# Patient Record
Sex: MALE | Race: WHITE | NOT HISPANIC OR LATINO | Employment: FULL TIME | URBAN - METROPOLITAN AREA
[De-identification: names, ages, dates, MRNs, and addresses within clinical notes are randomized per-mention and may not be internally consistent; named-entity substitution may affect disease eponyms.]

---

## 2018-07-23 ENCOUNTER — OFFICE VISIT (OUTPATIENT)
Dept: FAMILY MEDICINE | Facility: CLINIC | Age: 33
End: 2018-07-23
Payer: COMMERCIAL

## 2018-07-23 VITALS
DIASTOLIC BLOOD PRESSURE: 80 MMHG | RESPIRATION RATE: 16 BRPM | TEMPERATURE: 97.8 F | HEIGHT: 73 IN | SYSTOLIC BLOOD PRESSURE: 138 MMHG | BODY MASS INDEX: 40.42 KG/M2 | HEART RATE: 82 BPM | WEIGHT: 305 LBS | OXYGEN SATURATION: 98 %

## 2018-07-23 DIAGNOSIS — Z00.00 WELLNESS EXAMINATION: Primary | ICD-10-CM

## 2018-07-23 PROBLEM — J30.2 SEASONAL ALLERGIES: Status: ACTIVE | Noted: 2018-07-23

## 2018-07-23 PROBLEM — M41.9 SCOLIOSIS: Status: ACTIVE | Noted: 2018-07-23

## 2018-07-23 PROCEDURE — 90715 TDAP VACCINE 7 YRS/> IM: CPT | Performed by: FAMILY MEDICINE

## 2018-07-23 PROCEDURE — 90471 IMMUNIZATION ADMIN: CPT | Performed by: FAMILY MEDICINE

## 2018-07-23 PROCEDURE — 99385 PREV VISIT NEW AGE 18-39: CPT | Mod: 25 | Performed by: FAMILY MEDICINE

## 2018-07-23 ASSESSMENT — ENCOUNTER SYMPTOMS
NAUSEA: 0
SORE THROAT: 0
FEVER: 0
SHORTNESS OF BREATH: 0
COUGH: 0
ARTHRALGIAS: 0
SLEEP DISTURBANCE: 0
CHILLS: 0
FATIGUE: 0
PALPITATIONS: 0
BACK PAIN: 0
BLOOD IN STOOL: 0
ABDOMINAL PAIN: 0
FREQUENCY: 0
HEMATURIA: 0
VOMITING: 0
DIARRHEA: 0
CONSTIPATION: 0
HEADACHES: 0
SINUS PRESSURE: 0
DIZZINESS: 0

## 2018-07-23 NOTE — PROGRESS NOTES
Subjective      Patient ID: Michael Lew is a 33 y.o. male.    - Patient presenting for annual wellness exam as new patient  - Lab work - last time was about 2012  - Immunizations - due for Tdap  - Exercise - does a lot of jogging; high intensity interval training.  Scoliosis limits exercise at times.   - Diet - does really well during the week, drinks a lot of water; weekend is a different story; trying to eat less   - Lives with wife; they are thinking of having kids  - Works for [employer]        The following have been reviewed and updated as appropriate in this visit:  Tobacco  Allergies  Meds  Problems  Med Hx  Surg Hx  Fam Hx  Soc Hx         History reviewed. No pertinent past medical history.    History reviewed. No pertinent surgical history.    Social History     Social History   • Marital status:      Spouse name: N/A   • Number of children: N/A   • Years of education: N/A     Occupational History   • Not on file.     Social History Main Topics   • Smoking status: Never Smoker   • Smokeless tobacco: Never Used   • Alcohol use Yes   • Drug use: Unknown   • Sexual activity: Not on file     Other Topics Concern   • Not on file     Social History Narrative   • No narrative on file       Family History   Problem Relation Age of Onset   • Breast cancer Mother    • Diabetes Mother    • Hypertension Mother    • Asthma Father    • Diabetes Father        Patient has no known allergies.    No current outpatient prescriptions on file.     No current facility-administered medications for this visit.        Review of Systems   Constitutional: Negative for chills, fatigue and fever.   HENT: Negative for congestion, sinus pressure and sore throat.    Eyes: Negative for visual disturbance.   Respiratory: Negative for cough and shortness of breath.    Cardiovascular: Negative for chest pain, palpitations and leg swelling.   Gastrointestinal: Negative for abdominal pain, blood in stool,  "constipation, diarrhea, nausea and vomiting.   Endocrine: Negative for polyuria.   Genitourinary: Negative for frequency and hematuria.   Musculoskeletal: Negative for arthralgias and back pain.   Skin: Negative for rash.   Neurological: Negative for dizziness and headaches.   Psychiatric/Behavioral: Negative for behavioral problems and sleep disturbance.       Objective     Vitals:    07/23/18 1010   BP: 138/80   Patient Position: Sitting   Pulse: 82   Resp: 16   Temp: 36.6 °C (97.8 °F)   SpO2: 98%   Weight: (!) 138 kg (305 lb)   Height: 1.842 m (6' 0.5\")     Body mass index is 40.8 kg/m².    Physical Exam   Constitutional: He is oriented to person, place, and time. He appears well-developed and well-nourished.  Non-toxic appearance. No distress.   HENT:   Head: Normocephalic and atraumatic.   Right Ear: Tympanic membrane, external ear and ear canal normal.   Left Ear: Tympanic membrane, external ear and ear canal normal.   Nose: Nose normal.   Mouth/Throat: Uvula is midline and oropharynx is clear and moist.   Eyes: Conjunctivae are normal. Pupils are equal, round, and reactive to light.   Neck: Normal range of motion. No thyromegaly present.   Cardiovascular: Normal rate, regular rhythm, S1 normal, S2 normal and normal heart sounds.    No murmur heard.  Pulmonary/Chest: Effort normal and breath sounds normal. He has no wheezes. He has no rhonchi. He has no rales.   Abdominal: Soft. Bowel sounds are normal. There is no hepatosplenomegaly. There is no tenderness. There is no rebound and no guarding.   Musculoskeletal: Normal range of motion. He exhibits no edema.   Neurological: He is alert and oriented to person, place, and time. He has normal strength. He is not disoriented. No cranial nerve deficit or sensory deficit.   Psychiatric: He has a normal mood and affect. His behavior is normal. His mood appears not anxious. He does not exhibit a depressed mood.   Nursing note and vitals reviewed.      Assessment/Plan "   Problem List Items Addressed This Visit     None      Visit Diagnoses     Wellness examination    -  Primary    Relevant Orders    Lipid panel    Comprehensive metabolic panel    CBC and Differential

## 2018-07-23 NOTE — PATIENT INSTRUCTIONS
- Keep up the great work with exercise and healthy dietary changes!    - Check labs while fasting when you can  - Tdap today  - Would recommend seeing Dermatology - Dr. Chapis Nettles in [place], [facility] in [place], or Jeff Richmond in [place]    - Follow up in 1 year

## 2019-12-05 ENCOUNTER — TELEPHONE (OUTPATIENT)
Dept: FAMILY MEDICINE | Facility: CLINIC | Age: 34
End: 2019-12-05

## 2019-12-05 DIAGNOSIS — Z00.00 WELLNESS EXAMINATION: Primary | ICD-10-CM

## 2019-12-05 NOTE — TELEPHONE ENCOUNTER
This patient has an upcoming appointment with your office and would like to have their lab work completed prior to the visit.         Patient Preferred Laboratory: [facility]   Patient Primary Insurance in Epic:  N/A         or [place] Mail?: ELECTRONIC

## 2019-12-12 ENCOUNTER — APPOINTMENT (OUTPATIENT)
Dept: LAB | Facility: HOSPITAL | Age: 34
End: 2019-12-12
Attending: FAMILY MEDICINE
Payer: COMMERCIAL

## 2019-12-12 DIAGNOSIS — Z00.00 WELLNESS EXAMINATION: ICD-10-CM

## 2019-12-12 LAB
ALBUMIN SERPL-MCNC: 4 G/DL (ref 3.4–5)
ALP SERPL-CCNC: 57 IU/L (ref 35–126)
ALT SERPL-CCNC: 35 IU/L (ref 16–63)
ANION GAP SERPL CALC-SCNC: 7 MEQ/L (ref 3–15)
AST SERPL-CCNC: 23 IU/L (ref 15–41)
BASOPHILS # BLD: 0.02 K/UL (ref 0.01–0.1)
BASOPHILS NFR BLD: 0.4 %
BILIRUB SERPL-MCNC: 0.8 MG/DL (ref 0.3–1.2)
BUN SERPL-MCNC: 12 MG/DL (ref 8–20)
CALCIUM SERPL-MCNC: 9.4 MG/DL (ref 8.9–10.3)
CHLORIDE SERPL-SCNC: 105 MEQ/L (ref 98–109)
CHOLEST SERPL-MCNC: 162 MG/DL
CO2 SERPL-SCNC: 26 MEQ/L (ref 22–32)
CREAT SERPL-MCNC: 1 MG/DL
DIFFERENTIAL METHOD BLD: NORMAL
EOSINOPHIL # BLD: 0.13 K/UL (ref 0.04–0.54)
EOSINOPHIL NFR BLD: 2.6 %
ERYTHROCYTE [DISTWIDTH] IN BLOOD BY AUTOMATED COUNT: 12.5 % (ref 11.6–14.4)
GFR SERPL CREATININE-BSD FRML MDRD: >60 ML/MIN/1.73M*2
GLUCOSE SERPL-MCNC: 97 MG/DL (ref 70–99)
HCT VFR BLDCO AUTO: 43.2 % (ref 40.1–51)
HDLC SERPL-MCNC: 33 MG/DL
HDLC SERPL: 4.9 {RATIO}
HGB BLD-MCNC: 14.5 G/DL
IMM GRANULOCYTES # BLD AUTO: 0.01 K/UL (ref 0–0.08)
IMM GRANULOCYTES NFR BLD AUTO: 0.2 %
LDLC SERPL CALC-MCNC: 109 MG/DL
LYMPHOCYTES # BLD: 1.84 K/UL (ref 1.2–3.5)
LYMPHOCYTES NFR BLD: 36.9 %
MCH RBC QN AUTO: 30 PG (ref 28–33.2)
MCHC RBC AUTO-ENTMCNC: 33.6 G/DL (ref 32.2–36.5)
MCV RBC AUTO: 89.3 FL (ref 83–98)
MONOCYTES # BLD: 0.36 K/UL (ref 0.3–1)
MONOCYTES NFR BLD: 7.2 %
NEUTROPHILS # BLD: 2.63 K/UL (ref 1.7–7)
NEUTS SEG NFR BLD: 52.7 %
NONHDLC SERPL-MCNC: 129 MG/DL
NRBC BLD-RTO: 0 %
PDW BLD AUTO: 10.7 FL (ref 9.4–12.4)
PLATELET # BLD AUTO: 200 K/UL
POTASSIUM SERPL-SCNC: 4.4 MEQ/L (ref 3.6–5.1)
PROT SERPL-MCNC: 6.1 G/DL (ref 6–8.2)
RBC # BLD AUTO: 4.84 M/UL (ref 4.5–5.8)
SODIUM SERPL-SCNC: 138 MEQ/L (ref 136–144)
TRIGL SERPL-MCNC: 100 MG/DL (ref 30–149)
WBC # BLD AUTO: 4.99 K/UL

## 2019-12-12 PROCEDURE — 80061 LIPID PANEL: CPT

## 2019-12-12 PROCEDURE — 80053 COMPREHEN METABOLIC PANEL: CPT

## 2019-12-12 PROCEDURE — 36415 COLL VENOUS BLD VENIPUNCTURE: CPT

## 2019-12-12 PROCEDURE — 85025 COMPLETE CBC W/AUTO DIFF WBC: CPT

## 2019-12-30 ENCOUNTER — OFFICE VISIT (OUTPATIENT)
Dept: FAMILY MEDICINE | Facility: CLINIC | Age: 34
End: 2019-12-30
Payer: COMMERCIAL

## 2019-12-30 VITALS
SYSTOLIC BLOOD PRESSURE: 140 MMHG | WEIGHT: 315 LBS | RESPIRATION RATE: 16 BRPM | HEART RATE: 85 BPM | HEIGHT: 72 IN | OXYGEN SATURATION: 98 % | TEMPERATURE: 98 F | BODY MASS INDEX: 42.66 KG/M2 | DIASTOLIC BLOOD PRESSURE: 80 MMHG

## 2019-12-30 DIAGNOSIS — Z00.00 WELLNESS EXAMINATION: Primary | ICD-10-CM

## 2019-12-30 DIAGNOSIS — R45.4 IRRITABILITY: ICD-10-CM

## 2019-12-30 DIAGNOSIS — E78.2 MIXED HYPERLIPIDEMIA: ICD-10-CM

## 2019-12-30 DIAGNOSIS — E66.01 MORBID OBESITY (CMS/HCC): ICD-10-CM

## 2019-12-30 PROCEDURE — 99395 PREV VISIT EST AGE 18-39: CPT | Performed by: FAMILY MEDICINE

## 2019-12-30 ASSESSMENT — ENCOUNTER SYMPTOMS
HEMATURIA: 0
ARTHRALGIAS: 0
HEADACHES: 0
NAUSEA: 0
CONSTIPATION: 0
VOMITING: 0
SORE THROAT: 0
BLOOD IN STOOL: 0
SLEEP DISTURBANCE: 0
CHILLS: 0
DIARRHEA: 0
BACK PAIN: 0
FATIGUE: 0
PALPITATIONS: 0
DIZZINESS: 0
SINUS PRESSURE: 0
ABDOMINAL PAIN: 0
SHORTNESS OF BREATH: 0
FREQUENCY: 0
COUGH: 0
FEVER: 0

## 2019-12-30 NOTE — PROGRESS NOTES
Subjective      Patient ID: Michael Lew is a 34 y.o. male.  1985      - Patient presenting for annual wellness exam  - Lab work - due for repeat labs  - Immunizations - UTD on Tdap  - Exercise - not much with   - Diet - not great this year since moving and having a new son  - Lives with wife and 2 month old son  - He is traveling a lot for work - working for [employer]    - Trying to get back into a rhythm with his wife in terms of lifestyle changes    - Has been having more stress headaches.  Irritability has increased.  Thinks that this is a combination of home life changes and stress at work.  Has thought about therapy.      The following have been reviewed and updated as appropriate in this visit:  Tobacco  Allergies  Meds  Problems  Med Hx  Surg Hx  Fam Hx  Soc Hx         History reviewed. No pertinent past medical history.    History reviewed. No pertinent surgical history.    Social History     Socioeconomic History   • Marital status:      Spouse name: Not on file   • Number of children: Not on file   • Years of education: Not on file   • Highest education level: Not on file   Occupational History   • Not on file   Social Needs   • Financial resource strain: Not on file   • Food insecurity:     Worry: Not on file     Inability: Not on file   • Transportation needs:     Medical: Not on file     Non-medical: Not on file   Tobacco Use   • Smoking status: Never Smoker   • Smokeless tobacco: Never Used   Substance and Sexual Activity   • Alcohol use: Yes   • Drug use: Not on file   • Sexual activity: Not on file   Lifestyle   • Physical activity:     Days per week: Not on file     Minutes per session: Not on file   • Stress: Not on file   Relationships   • Social connections:     Talks on phone: Not on file     Gets together: Not on file     Attends Restorationism service: Not on file     Active member of club or organization: Not on file     Attends meetings of clubs or organizations: Not on  "file     Relationship status: Not on file   • Intimate partner violence:     Fear of current or ex partner: Not on file     Emotionally abused: Not on file     Physically abused: Not on file     Forced sexual activity: Not on file   Other Topics Concern   • Not on file   Social History Narrative   • Not on file       Family History   Problem Relation Age of Onset   • Breast cancer Biological Mother    • Diabetes Biological Mother    • Hypertension Biological Mother    • Asthma Biological Father    • Diabetes Biological Father        Patient has no known allergies.    Current Outpatient Medications   Medication Sig Dispense Refill   • multivit-min/ferrous fumarate (MULTI VITAMIN ORAL) Take by mouth.       No current facility-administered medications for this visit.        Review of Systems   Constitutional: Negative for chills, fatigue and fever.   HENT: Negative for congestion, sinus pressure and sore throat.    Eyes: Negative for visual disturbance.   Respiratory: Negative for cough and shortness of breath.    Cardiovascular: Negative for chest pain, palpitations and leg swelling.   Gastrointestinal: Negative for abdominal pain, blood in stool, constipation, diarrhea, nausea and vomiting.   Endocrine: Negative for polyuria.   Genitourinary: Negative for frequency and hematuria.   Musculoskeletal: Negative for arthralgias and back pain.   Skin: Negative for rash.   Neurological: Negative for dizziness and headaches.   Psychiatric/Behavioral: Negative for behavioral problems and sleep disturbance.       Objective     Vitals:    12/30/19 1443   BP: 140/80   Patient Position: Sitting   Pulse: 85   Resp: 16   Temp: 36.7 °C (98 °F)   TempSrc: Temporal   SpO2: 98%   Weight: (!) 154 kg (340 lb)   Height: 1.816 m (5' 11.5\")     Body mass index is 46.76 kg/m².    Physical Exam   Constitutional: He is oriented to person, place, and time. He appears well-developed and well-nourished.  Non-toxic appearance. No distress.   HENT: "   Head: Normocephalic and atraumatic.   Right Ear: Tympanic membrane, external ear and ear canal normal.   Left Ear: Tympanic membrane, external ear and ear canal normal.   Nose: Nose normal.   Mouth/Throat: Uvula is midline and oropharynx is clear and moist.   Eyes: Pupils are equal, round, and reactive to light. Conjunctivae are normal.   Neck: Normal range of motion. No thyromegaly present.   Cardiovascular: Normal rate, regular rhythm, S1 normal, S2 normal and normal heart sounds.   No murmur heard.  Pulmonary/Chest: Effort normal and breath sounds normal. He has no wheezes. He has no rhonchi. He has no rales.   Abdominal: Soft. Bowel sounds are normal. There is no hepatosplenomegaly. There is no tenderness. There is no rebound and no guarding.   Obese abdomen   Musculoskeletal: Normal range of motion. He exhibits no edema.   Neurological: He is alert and oriented to person, place, and time. He has normal strength. He is not disoriented. No cranial nerve deficit or sensory deficit.   Psychiatric: He has a normal mood and affect. His behavior is normal. His mood appears not anxious. He does not exhibit a depressed mood.   Nursing note and vitals reviewed.      Assessment/Plan   Diagnoses and all orders for this visit:    Wellness examination (Primary)    Irritability  -     TSH w reflex FT4; Future    Mixed hyperlipidemia  -     Comprehensive metabolic panel; Future  -     Lipid panel; Future    Morbid obesity (CMS/HCC)    1. Wellness examination  - Recommend improving diet and trying to increase exercise  - Recheck labs in 6 months  - UTD on immunizations  - Follow up in 6 months time for weight check and BP check    2. Irritability  - Long discussion today with patient about looking into therapy/counseling  - Consider individual and couples counseling  - Check TSH with next lab draw  - Follow up as needed if not improving  - TSH w reflex FT4; Future  - TSH w reflex FT4    3. Mixed hyperlipidemia  -  Comprehensive metabolic panel; Future  - Lipid panel; Future  - Comprehensive metabolic panel  - Lipid panel    4. Morbid Obesity  - lifestyle changes discussed as above

## 2019-12-30 NOTE — PATIENT INSTRUCTIONS
- Look into getting some therapy over the next couple of weeks - this can definitely help   - Recheck labs in about 6 months  - Follow up in 6 months for follow up of blood work

## 2020-12-01 ENCOUNTER — TELEPHONE (OUTPATIENT)
Dept: FAMILY MEDICINE | Facility: CLINIC | Age: 35
End: 2020-12-01

## 2020-12-02 ENCOUNTER — TELEMEDICINE (OUTPATIENT)
Dept: FAMILY MEDICINE | Facility: CLINIC | Age: 35
End: 2020-12-02
Payer: COMMERCIAL

## 2020-12-02 DIAGNOSIS — R10.11 RUQ ABDOMINAL PAIN: ICD-10-CM

## 2020-12-02 DIAGNOSIS — R06.02 SHORTNESS OF BREATH: Primary | ICD-10-CM

## 2020-12-02 PROCEDURE — 99213 OFFICE O/P EST LOW 20 MIN: CPT | Mod: 95 | Performed by: FAMILY MEDICINE

## 2020-12-02 NOTE — PROGRESS NOTES
Verification of Patient Location:  The patient affirms they are currently located in the following state:[place]     Request for Consent:  You and I are about to have a telemedicine check-in or visit. This is allowed because you are already my patient, and you have requested it.  This telemedicine visit will be billed to your health insurance or you, if you are self-insured.  You understand you will be responsible for any copayments or coinsurances that apply to your telemedicine visit.  Before starting our telemedicine visit, I am required to get your consent for this virtual check-in or visit by telemedicine. Do you consent?      Patient Response to Request for Consent: Yes    The following have been reviewed and updated as appropriate in this visit:  Tobacco  Allergies  Meds  Problems  Med Hx  Surg Hx  Fam Hx       History reviewed. No pertinent past medical history.    History reviewed. No pertinent surgical history.    Social History     Socioeconomic History   • Marital status:      Spouse name: Not on file   • Number of children: Not on file   • Years of education: Not on file   • Highest education level: Not on file   Occupational History   • Not on file   Social Needs   • Financial resource strain: Not on file   • Food insecurity     Worry: Not on file     Inability: Not on file   • Transportation needs     Medical: Not on file     Non-medical: Not on file   Tobacco Use   • Smoking status: Never Smoker   • Smokeless tobacco: Never Used   Substance and Sexual Activity   • Alcohol use: Yes   • Drug use: Not on file   • Sexual activity: Not on file   Lifestyle   • Physical activity     Days per week: Not on file     Minutes per session: Not on file   • Stress: Not on file   Relationships   • Social connections     Talks on phone: Not on file     Gets together: Not on file     Attends Protestant service: Not on file     Active member of club or organization: Not on file     Attends meetings of  clubs or organizations: Not on file     Relationship status: Not on file   • Intimate partner violence     Fear of current or ex partner: Not on file     Emotionally abused: Not on file     Physically abused: Not on file     Forced sexual activity: Not on file   Other Topics Concern   • Not on file   Social History Narrative   • Not on file       Family History   Problem Relation Age of Onset   • Breast cancer Biological Mother    • Diabetes Biological Mother    • Hypertension Biological Mother    • Asthma Biological Father    • Diabetes Biological Father        Patient has no known allergies.    Current Outpatient Medications   Medication Sig Dispense Refill   • multivit-min/ferrous fumarate (MULTI VITAMIN ORAL) Take by mouth.       No current facility-administered medications for this visit.        Visit Documentation:  - patient scheduling telemedicine visit or shortness of breath  - States that 1 week ago it developed gradually  - Chalked it up to stress at first and getting ready for the holidays  - Around the Thanksgiving day, developed some achiness/dull discomfort in the R lower rib cage area  - No pleuritic chest pain  - No chest pain  - No difficulty with taking deep breaths  - No increased pain with motion  - No other sick symptoms - no cough, fevers, chills  - Does report that he has had an increase in food intake over the holiday week  - The discomfort in the lower R area is not worsened by pressure  - He does report that discomfort is improved  - Denies nausea, vomiting, diarrhea, heartburn  - He was worried about gallbladder given location of discomfort    A/P:  1. Shortness of breath  - Suspect level of deconditioning and anxiety  - Possible MSK strain  - Possibility of gallbladder?  - Continue monitoring symptoms  - Recommend fluids and rest  - Avoid high foods  - Follow up message in 1 week with update  - Urgent care precautions discussed    2. RUQ abdominal pain  - Plan as above  - Consider  imaging if persistent  - Patient to notify me later next week      Time Spent in Medical Discussion During This Encounter:  19 minutes

## 2020-12-04 ENCOUNTER — TELEPHONE (OUTPATIENT)
Dept: FAMILY MEDICINE | Facility: CLINIC | Age: 35
End: 2020-12-04

## 2020-12-04 NOTE — TELEPHONE ENCOUNTER
Tristen had telemed apt (12/2/2020) and states he still has shortness of breath.  Would like you to order covid test

## 2020-12-21 ENCOUNTER — TELEMEDICINE (OUTPATIENT)
Dept: FAMILY MEDICINE | Facility: CLINIC | Age: 35
End: 2020-12-21
Payer: COMMERCIAL

## 2020-12-21 DIAGNOSIS — R06.02 SHORTNESS OF BREATH: Primary | ICD-10-CM

## 2020-12-21 PROCEDURE — 99213 OFFICE O/P EST LOW 20 MIN: CPT | Mod: 95 | Performed by: FAMILY MEDICINE

## 2020-12-21 RX ORDER — ALBUTEROL SULFATE 90 UG/1
2 INHALANT RESPIRATORY (INHALATION) EVERY 6 HOURS PRN
Qty: 1 INHALER | Refills: 1 | Status: SHIPPED | OUTPATIENT
Start: 2020-12-21 | End: 2022-01-05

## 2020-12-21 ASSESSMENT — ENCOUNTER SYMPTOMS
ABDOMINAL PAIN: 0
RHINORRHEA: 0
NAUSEA: 0
HEADACHES: 0
WHEEZING: 0
FEVER: 0
FATIGUE: 0
VOMITING: 0
COUGH: 0
SHORTNESS OF BREATH: 0
CHILLS: 0
SINUS PRESSURE: 0
SINUS PAIN: 0
DIARRHEA: 0
CONSTIPATION: 0
SORE THROAT: 0
PALPITATIONS: 0

## 2020-12-21 NOTE — PROGRESS NOTES
Verification of Patient Location:  The patient affirms they are currently located in the following state: [place]    Request for Consent:    Audio Only Encounter   You and I are about to have a telemedicine check-in or visit. This is allowed because you have requested it. This telemedicine visit will be billed to your health insurance or you, if you are self-insured. You understand you will be responsible for any copayments or coinsurances that apply to your telemedicine visit. Before starting our telemedicine visit, I am required to get your consent for this virtual check-in or visit by telemedicine. Do you consent?    Patient Response to Request for Consent:  Yes      Visit Documentation:  Subjective     Patient ID: Michael Lew is a 35 y.o. male.  1985      HPI    Had some shortness of breath during Thanksgiving. Had a televisit with Dr. Mitchell at that time and decided not to get tested cor COVID that week. Since then the acute symptoms has resolved but still with episodes of bronchospasm that hits for 30 minutes at a time and then resolves. Yesterday had it once in the evening. So far today has not had at all. Went as a family to get COVID testing done a week ago (and the doctor listened to his lungs at that time and told him they were normal) after  came back positive for COVID on 12/12. All their tests came back negative. Has been quarantining since then. Has testing scheduled again for tomorrow. Has been monitoring BP, HR, and pulse ox which has all been normal. 128/78, 97-98% pulse ox. Has tried some flonase without much change. Does have a history of exercise induced asthma when he was in college and father has asthma.    The following have been reviewed and updated as appropriate in this visit:  Tobacco  Allergies  Meds  Problems  Med Hx  Surg Hx  Fam Hx       Review of Systems   Constitutional: Negative for chills, fatigue and fever.   HENT: Negative for congestion, ear  pain, postnasal drip, rhinorrhea, sinus pressure, sinus pain, sneezing and sore throat.    Respiratory: Negative for cough, shortness of breath and wheezing.    Cardiovascular: Negative for chest pain, palpitations and leg swelling.   Gastrointestinal: Negative for abdominal pain, constipation, diarrhea, nausea and vomiting.   Neurological: Negative for headaches.         Assessment/Plan   Diagnoses and all orders for this visit:    Shortness of breath (Primary)  Assessment & Plan:  Will start on a daily claritin and gave a rescue inhaler to try as needed.   If symptoms are not improving recommended evaluation to have a physical exam once quarantine period is over. Can consider spirometry or imaging if needed.      Other orders  -     albuterol HFA (VENTOLIN HFA) 90 mcg/actuation inhaler; Inhale 2 puffs every 6 (six) hours as needed for wheezing.      Time Spent in Medical Discussion During This Encounter:     20 minutes

## 2020-12-28 ENCOUNTER — TELEMEDICINE (OUTPATIENT)
Dept: FAMILY MEDICINE | Facility: CLINIC | Age: 35
End: 2020-12-28
Payer: COMMERCIAL

## 2020-12-28 DIAGNOSIS — Z20.822 CLOSE EXPOSURE TO COVID-19 VIRUS: ICD-10-CM

## 2020-12-28 DIAGNOSIS — R06.02 SHORTNESS OF BREATH: Primary | ICD-10-CM

## 2020-12-28 PROCEDURE — 99213 OFFICE O/P EST LOW 20 MIN: CPT | Mod: 95 | Performed by: FAMILY MEDICINE

## 2020-12-28 NOTE — PROGRESS NOTES
Verification of Patient Location:  The patient affirms they are currently located in the following state:[place]     Request for Consent:  You and I are about to have a telemedicine check-in or visit. This is allowed because you are already my patient, and you have requested it.  This telemedicine visit will be billed to your health insurance or you, if you are self-insured.  You understand you will be responsible for any copayments or coinsurances that apply to your telemedicine visit.  Before starting our telemedicine visit, I am required to get your consent for this virtual check-in or visit by telemedicine. Do you consent?      Patient Response to Request for Consent: Yes    The following have been reviewed and updated as appropriate in this visit:  Tobacco  Allergies  Meds  Problems  Med Hx  Surg Hx  Fam Hx       History reviewed. No pertinent past medical history.    History reviewed. No pertinent surgical history.    Social History     Socioeconomic History   • Marital status:      Spouse name: Not on file   • Number of children: Not on file   • Years of education: Not on file   • Highest education level: Not on file   Occupational History   • Not on file   Social Needs   • Financial resource strain: Not on file   • Food insecurity     Worry: Not on file     Inability: Not on file   • Transportation needs     Medical: Not on file     Non-medical: Not on file   Tobacco Use   • Smoking status: Never Smoker   • Smokeless tobacco: Never Used   Substance and Sexual Activity   • Alcohol use: Yes   • Drug use: Not on file   • Sexual activity: Not on file   Lifestyle   • Physical activity     Days per week: Not on file     Minutes per session: Not on file   • Stress: Not on file   Relationships   • Social connections     Talks on phone: Not on file     Gets together: Not on file     Attends Buddhist service: Not on file     Active member of club or organization: Not on file     Attends meetings of  clubs or organizations: Not on file     Relationship status: Not on file   • Intimate partner violence     Fear of current or ex partner: Not on file     Emotionally abused: Not on file     Physically abused: Not on file     Forced sexual activity: Not on file   Other Topics Concern   • Not on file   Social History Narrative   • Not on file       Family History   Problem Relation Age of Onset   • Breast cancer Biological Mother    • Diabetes Biological Mother    • Hypertension Biological Mother    • Asthma Biological Father    • Diabetes Biological Father        Patient has no known allergies.    Current Outpatient Medications   Medication Sig Dispense Refill   • albuterol HFA (VENTOLIN HFA) 90 mcg/actuation inhaler Inhale 2 puffs every 6 (six) hours as needed for wheezing. 1 Inhaler 1   • multivit-min/ferrous fumarate (MULTI VITAMIN ORAL) Take by mouth.       No current facility-administered medications for this visit.        Visit Documentation:  - Patient scheduling telemedicine visit to discuss ongoing symptoms  - Had telemedicine visit 1 week ago  - Son was sick week of 12/7  - Sitter tested positive for COVID on 12/12  - Patient, wife, and child also tested negative on 12/13  - Patient and wife got tested on 12/22 at [facility] in [place] - patient tested negative and wife tested positive  - Patient and wife again went to get tested again today - awaiting results  - He states that he is 75% back to his normal self but then  - Still slightly SOB  - No cough  - Taking ALBUTEROL and CLARITIN  - They have been quarantining at home since the wife positive test  - He is trying to figure out what to do going forward pending this test    A/P:  1. Shortness of breath  - Will await his most recent Covid test  - Can continue ALBUTEROL and CLARITIN for now  - Pending results, will discuss checking PFT and CXR  - Will likely check blood work as well given fatigue and due for wellness exam  - Explained  that if his symptoms start to worsen again, he should be actually evaluated as we previously discussed with imaging and vitals being checked  - ER precautions discussed  - It is possible that patient has had the virus at some point given two people being positive in the home, and that he is dealing with residual symptoms.  - Will await this most recent test and he will notify me with results    2. Close exposure to COVID-19 virus  - Plan as above      Time Spent in Medical Discussion During This Encounter:  25 minutes

## 2020-12-31 ENCOUNTER — TELEPHONE (OUTPATIENT)
Dept: PRIMARY CARE | Facility: CLINIC | Age: 35
End: 2020-12-31

## 2021-01-02 ENCOUNTER — APPOINTMENT (OUTPATIENT)
Dept: RADIOLOGY | Age: 36
End: 2021-01-02
Attending: FAMILY MEDICINE
Payer: COMMERCIAL

## 2021-01-02 ENCOUNTER — HOSPITAL ENCOUNTER (OUTPATIENT)
Facility: CLINIC | Age: 36
Discharge: HOME | End: 2021-01-02
Attending: FAMILY MEDICINE
Payer: COMMERCIAL

## 2021-01-02 VITALS
DIASTOLIC BLOOD PRESSURE: 90 MMHG | TEMPERATURE: 97.6 F | OXYGEN SATURATION: 98 % | SYSTOLIC BLOOD PRESSURE: 146 MMHG | HEART RATE: 83 BPM

## 2021-01-02 DIAGNOSIS — R06.00 DYSPNEA, UNSPECIFIED TYPE: ICD-10-CM

## 2021-01-02 DIAGNOSIS — R05.9 COUGH: Primary | ICD-10-CM

## 2021-01-02 LAB
RAPID INFLUENZA A AGN: NEGATIVE
RAPID INFLUENZA B AGN: NEGATIVE

## 2021-01-02 PROCEDURE — 87804 INFLUENZA ASSAY W/OPTIC: CPT | Performed by: FAMILY MEDICINE

## 2021-01-02 PROCEDURE — 71046 X-RAY EXAM CHEST 2 VIEWS: CPT | Performed by: FAMILY MEDICINE

## 2021-01-02 PROCEDURE — S9088 SERVICES PROVIDED IN URGENT: HCPCS | Performed by: FAMILY MEDICINE

## 2021-01-02 PROCEDURE — 99214 OFFICE O/P EST MOD 30 MIN: CPT | Performed by: FAMILY MEDICINE

## 2021-01-02 ASSESSMENT — ENCOUNTER SYMPTOMS
NAUSEA: 0
CONSTITUTIONAL NEGATIVE: 1
CONSTIPATION: 0
SINUS PRESSURE: 0
SINUS PAIN: 0
COUGH: 1
DIARRHEA: 0
SORE THROAT: 0
VOMITING: 0
BLOOD IN STOOL: 0
EYES NEGATIVE: 1
PALPITATIONS: 0
ENDOCRINE NEGATIVE: 1
WHEEZING: 0
SHORTNESS OF BREATH: 1
PSYCHIATRIC NEGATIVE: 1
MUSCULOSKELETAL NEGATIVE: 1
NEUROLOGICAL NEGATIVE: 1

## 2021-01-02 NOTE — DISCHARGE INSTRUCTIONS
Go to ER at [facility] if symptoms are worse or persistent today ,needs further evaluation of your symptoms  Call your primary care doctor for follow up for your symptoms

## 2021-01-02 NOTE — ED PROVIDER NOTES
History  Chief Complaint   Patient presents with   • SOB     cough ,chest tightness ,epigastric and RTTQU abdominal pain     He is here with the c/o shortness,cough with mucus ( initially was dry) and feels discomfort epigastric and RTUQ of the abdomen.  No fever  Has symptoms from 12/01 .  12/11 son's  had covid positive ,he has 14 month old son  Then his son had cold symptoms but got better  12/13 he was checked for COVDI and it was negative  12/22 wife was tested positive but his test was negative .  Again he was tested on 12/28 and he got results yesterday and it was negative  He had telemedicine call with pcp office couple of times .  Was given albuterol to use as needed  No calf pain or swelling  No h/o DVT or PE   No h/o GERD   No rash   Was told it could be gall bladder problem with his abdominal pain RT UQ   Also was suggested to get a CXR done if cough persists.              History reviewed. No pertinent past medical history.    History reviewed. No pertinent surgical history.    Family History   Problem Relation Age of Onset   • Breast cancer Biological Mother    • Diabetes Biological Mother    • Hypertension Biological Mother    • Asthma Biological Father    • Diabetes Biological Father        Social History     Tobacco Use   • Smoking status: Never Smoker   • Smokeless tobacco: Never Used   Substance Use Topics   • Alcohol use: Yes   • Drug use: Not on file       Review of Systems   Constitutional: Negative.    HENT: Negative for ear discharge, ear pain, sinus pressure, sinus pain and sore throat.    Eyes: Negative.    Respiratory: Positive for cough and shortness of breath. Negative for wheezing.    Cardiovascular: Negative for chest pain, palpitations and leg swelling.   Gastrointestinal: Negative for blood in stool, constipation, diarrhea, nausea and vomiting.   Endocrine: Negative.    Genitourinary: Negative.    Musculoskeletal: Negative.    Skin: Negative for rash.   Neurological:  Negative.    Psychiatric/Behavioral: Negative.        Physical Exam  ED Triage Vitals [01/02/21 1217]   Temp Heart Rate Resp BP SpO2   36.4 °C (97.6 °F) 83 -- (!) 146/90 98 %      Temp src Heart Rate Source Patient Position BP Location FiO2 (%) (Set)   -- -- Sitting Left upper arm --       Physical Exam  Constitutional:       General: He is not in acute distress.  HENT:      Right Ear: Tympanic membrane, ear canal and external ear normal. There is no impacted cerumen.      Left Ear: Tympanic membrane, ear canal and external ear normal. There is no impacted cerumen.      Nose: Nose normal.      Mouth/Throat:      Mouth: Mucous membranes are moist.      Pharynx: No oropharyngeal exudate or posterior oropharyngeal erythema.   Eyes:      General:         Right eye: No discharge.         Left eye: No discharge.      Conjunctiva/sclera: Conjunctivae normal.      Pupils: Pupils are equal, round, and reactive to light.   Neck:      Musculoskeletal: Normal range of motion.      Vascular: No carotid bruit.   Cardiovascular:      Rate and Rhythm: Normal rate and regular rhythm.      Pulses: Normal pulses.      Heart sounds: Normal heart sounds.   Pulmonary:      Effort: Pulmonary effort is normal. No respiratory distress.      Breath sounds: Normal breath sounds. No stridor. No wheezing, rhonchi or rales.      Comments: Not tachypneic   No chest retractions  Chest:      Chest wall: No tenderness.   Abdominal:      General: Bowel sounds are normal.      Palpations: Abdomen is soft.      Comments: Discomfort in epigastric and  RUQ of the abdomen   Neurological:      Mental Status: He is alert.           Procedures  Procedures    UC Course  Clinical Impressions as of Jan 02 1736   Cough   Dyspnea, unspecified type       MDM  Number of Diagnoses or Management Options  Cough:   Dyspnea, unspecified type:   Diagnosis management comments: CXR done which is normal  Rapid flu done which is negative  Advised to continue albuterol as  needed ,possible reactive air way  No wheezing on exam today  Can try prilosec for possible GERD as has episgastric pain  Offered EKG but he would like told to hold off which should be ok as he has URI symptoms  He also needs to be evaluated for his RT UQ abdominal pain ,thought not acute now ,advised to go to ER if gets worse needs stat labs and imaging like U/S   Also may need further testing like EKG/Cardiac enzymes/D d dimer and CT chest if symptoms are persistent or worse so as above advised to go ER at that time   See f/u instructions  BP is high ,needs recheck with pcp                 Colette Krishna MD  01/02/21 1738       Colette Krishna MD  01/02/21 1751       Colette Krishna MD  01/02/21 1752

## 2021-01-04 ENCOUNTER — TELEPHONE (OUTPATIENT)
Dept: FAMILY MEDICINE | Facility: CLINIC | Age: 36
End: 2021-01-04

## 2021-01-05 ENCOUNTER — OFFICE VISIT (OUTPATIENT)
Dept: FAMILY MEDICINE | Facility: CLINIC | Age: 36
End: 2021-01-05
Payer: COMMERCIAL

## 2021-01-05 VITALS
SYSTOLIC BLOOD PRESSURE: 142 MMHG | TEMPERATURE: 97.8 F | RESPIRATION RATE: 14 BRPM | OXYGEN SATURATION: 98 % | WEIGHT: 315 LBS | BODY MASS INDEX: 42.66 KG/M2 | DIASTOLIC BLOOD PRESSURE: 90 MMHG | HEIGHT: 72 IN | HEART RATE: 91 BPM

## 2021-01-05 DIAGNOSIS — R10.13 EPIGASTRIC PAIN: ICD-10-CM

## 2021-01-05 DIAGNOSIS — R53.83 FATIGUE, UNSPECIFIED TYPE: ICD-10-CM

## 2021-01-05 DIAGNOSIS — M54.42 CHRONIC BILATERAL LOW BACK PAIN WITH LEFT-SIDED SCIATICA: ICD-10-CM

## 2021-01-05 DIAGNOSIS — F41.9 ANXIETY: ICD-10-CM

## 2021-01-05 DIAGNOSIS — G89.29 CHRONIC BILATERAL LOW BACK PAIN WITH LEFT-SIDED SCIATICA: ICD-10-CM

## 2021-01-05 DIAGNOSIS — R06.02 SHORTNESS OF BREATH: Primary | ICD-10-CM

## 2021-01-05 PROCEDURE — 99215 OFFICE O/P EST HI 40 MIN: CPT | Performed by: FAMILY MEDICINE

## 2021-01-05 RX ORDER — OMEPRAZOLE 40 MG/1
40 CAPSULE, DELAYED RELEASE ORAL
Qty: 30 CAPSULE | Refills: 2 | Status: SHIPPED | OUTPATIENT
Start: 2021-01-05 | End: 2021-02-04 | Stop reason: SDUPTHER

## 2021-01-05 ASSESSMENT — ENCOUNTER SYMPTOMS
VOMITING: 0
TROUBLE SWALLOWING: 0
WEIGHT LOSS: 0
SHORTNESS OF BREATH: 1
HEADACHES: 0
UNEXPECTED WEIGHT CHANGE: 0
FATIGUE: 1
AGITATION: 1
HEMATOCHEZIA: 0
NERVOUS/ANXIOUS: 1
COUGH: 1
CHEST TIGHTNESS: 0
NAUSEA: 0
FEVER: 0
CONSTIPATION: 0
ANOREXIA: 0
TINGLING: 0
PERIANAL NUMBNESS: 0
HEMATURIA: 0
NUMBNESS: 0
BOWEL INCONTINENCE: 0
FREQUENCY: 0
ABDOMINAL PAIN: 1
WEAKNESS: 0
MYALGIAS: 0
DYSURIA: 0
PALPITATIONS: 0
SLEEP DISTURBANCE: 1
BACK PAIN: 1
DIARRHEA: 0
PARESTHESIAS: 0
WHEEZING: 0
PARESIS: 0
CHILLS: 0
BLOOD IN STOOL: 0

## 2021-01-05 NOTE — PROGRESS NOTES
Subjective      Patient ID: Michael Lew is a 35 y.o. male.  1985      - Patient presenting for follow up of ongoing symptoms  - He has had a few telemedicine visits for ongoing symptoms of shortness of breath, fatigue, and abd discomfort.  - Recently when to the urgent care on 1/2/2021 - had CXR done which was negative.  Flu and COVID testing was negative.  - Son is feeling better  - Wife feeling overall better - she has had a repeat COVID test which was now negative  - Pulse ox at home has been 98%  - BP at home ranging from 130/80  - Since last visit he has cut back on caffeine, beers, salt intake  - Started supplemental Vitamin C, Vitamin D, Zinc  - Concerned that anxiety has been playing a role in his symptoms  - Feels more short tempered, anxious, and panicky at times.  Irritable as well.  - Wife deals with anxiety for long time and takes Zoloft.  Has young child at home and thinks this has brought some underlying anxiety to the surface.    - Also having epigastric pain as below    - Also having back pain   - 14 years ago was carrying a garbage container full of water; when walking up the stairs he slipped and caught himself awkwardly.  - He has had issues with chronic lower back pain since then  - Has tried VICODIN and PREDNISONE for flare ups in the past  - Pain now radiates to hip at times  - Gets numbness in the hamstring  - Tries to adjust sleeping position to help  - No history of imaging in the past  - No weakness   - No changes in pain level with his weight changes    Abdominal Pain  This is a recurrent problem. The current episode started more than 1 month ago. The onset quality is undetermined. The problem occurs intermittently. The problem has been waxing and waning. The pain is located in the epigastric region. The pain is moderate. The quality of the pain is tearing and sharp. The abdominal pain does not radiate. Pertinent negatives include no anorexia, constipation, diarrhea, dysuria,  fever, frequency, headaches, hematochezia, hematuria, melena, myalgias, nausea, vomiting or weight loss. Nothing aggravates the pain. The pain is relieved by nothing. He has tried nothing for the symptoms. The treatment provided no relief.   Back Pain  This is a chronic problem. The current episode started more than 1 year ago. The problem occurs intermittently. The problem has been waxing and waning since onset. The pain is present in the lumbar spine. The quality of the pain is described as aching. The pain does not radiate. The pain is moderate. Associated symptoms include abdominal pain. Pertinent negatives include no bladder incontinence, bowel incontinence, chest pain, dysuria, fever, headaches, numbness, paresis, paresthesias, perianal numbness, tingling, weakness or weight loss. He has tried analgesics and NSAIDs for the symptoms. The treatment provided mild relief.       The following have been reviewed and updated as appropriate in this visit:  Tobacco  Allergies  Meds  Problems  Med Hx  Surg Hx  Fam Hx        History reviewed. No pertinent past medical history.    History reviewed. No pertinent surgical history.    Social History     Socioeconomic History   • Marital status:      Spouse name: Not on file   • Number of children: Not on file   • Years of education: Not on file   • Highest education level: Not on file   Occupational History   • Not on file   Social Needs   • Financial resource strain: Not on file   • Food insecurity     Worry: Not on file     Inability: Not on file   • Transportation needs     Medical: Not on file     Non-medical: Not on file   Tobacco Use   • Smoking status: Never Smoker   • Smokeless tobacco: Never Used   Substance and Sexual Activity   • Alcohol use: Yes   • Drug use: Not on file   • Sexual activity: Not on file   Lifestyle   • Physical activity     Days per week: Not on file     Minutes per session: Not on file   • Stress: Not on file   Relationships   •  Social connections     Talks on phone: Not on file     Gets together: Not on file     Attends Confucianist service: Not on file     Active member of club or organization: Not on file     Attends meetings of clubs or organizations: Not on file     Relationship status: Not on file   • Intimate partner violence     Fear of current or ex partner: Not on file     Emotionally abused: Not on file     Physically abused: Not on file     Forced sexual activity: Not on file   Other Topics Concern   • Not on file   Social History Narrative   • Not on file       Family History   Problem Relation Age of Onset   • Breast cancer Biological Mother    • Diabetes Biological Mother    • Hypertension Biological Mother    • Asthma Biological Father    • Diabetes Biological Father        Patient has no known allergies.    Current Outpatient Medications   Medication Sig Dispense Refill   • albuterol HFA (VENTOLIN HFA) 90 mcg/actuation inhaler Inhale 2 puffs every 6 (six) hours as needed for wheezing. 1 Inhaler 1   • multivit-min/ferrous fumarate (MULTI VITAMIN ORAL) Take by mouth.     • omeprazole (PriLOSEC) 40 mg capsule Take 1 capsule (40 mg total) by mouth daily before breakfast. 30 capsule 2     No current facility-administered medications for this visit.        Review of Systems   Constitutional: Positive for fatigue. Negative for chills, fever, unexpected weight change and weight loss.   HENT: Negative for trouble swallowing.    Eyes: Negative for visual disturbance.   Respiratory: Positive for cough and shortness of breath. Negative for chest tightness and wheezing.    Cardiovascular: Negative for chest pain, palpitations and leg swelling.   Gastrointestinal: Positive for abdominal pain. Negative for anorexia, blood in stool, bowel incontinence, constipation, diarrhea, hematochezia, melena, nausea and vomiting.   Endocrine: Negative for polyuria.   Genitourinary: Negative for bladder incontinence, dysuria, frequency and hematuria.    Musculoskeletal: Positive for back pain. Negative for myalgias.   Skin: Negative for rash.   Neurological: Negative for tingling, weakness, numbness, headaches and paresthesias.   Psychiatric/Behavioral: Positive for agitation and sleep disturbance. Negative for suicidal ideas. The patient is nervous/anxious.        Objective     Vitals:    01/05/21 1013   BP: (!) 142/90   BP Location: Right upper arm   Patient Position: Sitting   Pulse: 91   Resp: 14   Temp: 36.6 °C (97.8 °F)   TempSrc: Temporal   SpO2: 98%   Weight: (!) 147 kg (324 lb)   Height: 1.829 m (6')     Body mass index is 43.94 kg/m².    Physical Exam  Vitals signs and nursing note reviewed.   Constitutional:       General: He is not in acute distress.     Appearance: He is normal weight. He is not ill-appearing, toxic-appearing or diaphoretic.   HENT:      Head: Normocephalic and atraumatic.   Neck:      Musculoskeletal: Normal range of motion.   Cardiovascular:      Rate and Rhythm: Normal rate and regular rhythm.      Pulses: Normal pulses.      Heart sounds: Normal heart sounds. No murmur. No friction rub. No gallop.    Pulmonary:      Effort: Pulmonary effort is normal.      Breath sounds: Normal breath sounds. No wheezing, rhonchi or rales.   Chest:      Chest wall: No tenderness.   Abdominal:      General: Bowel sounds are normal. There is no distension.      Palpations: There is no mass.      Tenderness: There is no abdominal tenderness. There is no guarding or rebound. Negative signs include Barrientos's sign.   Musculoskeletal: Normal range of motion.      Comments: ttp in the L SI joint where he points   Neurological:      General: No focal deficit present.      Mental Status: He is alert and oriented to person, place, and time. Mental status is at baseline.      Cranial Nerves: No cranial nerve deficit.      Sensory: No sensory deficit.      Gait: Gait normal.   Psychiatric:         Behavior: Behavior normal.         Thought Content: Thought  content normal.         Judgment: Judgment normal.         Assessment/Plan   Diagnoses and all orders for this visit:    Shortness of breath (Primary)    Anxiety  -     CBC and Differential; Future  -     Comprehensive metabolic panel; Future  -     TSH w reflex FT4; Future    Fatigue, unspecified type  -     Comprehensive metabolic panel; Future  -     TSH w reflex FT4; Future  -     Lyme Disease Antibodies (IgG, IgM),; Future    Epigastric pain  -     Lipid panel; Future  -     ULTRASOUND ABDOMEN COMPLETE; Future    Chronic bilateral low back pain with left-sided sciatica  -     X-RAY LUMBAR SPINE 2 OR 3 VIEWS; Future    Other orders  -     omeprazole (PriLOSEC) 40 mg capsule; Take 1 capsule (40 mg total) by mouth daily before breakfast.    1. Shortness of breath  - Suspect that his is multifactorial  -- I suspect that he did have a viral illness (Covid or not) and he is dealing with a level of reactive airway from this  - CXR clear and lungs clear today  - Likely level of deconditioning and anxiety as well contributing to symptoms  - Consider PFTs pending work up as below  - Follow up pending work up as below    2. Anxiety  - Long discussion about his symptoms  - Check labs to rule out metabolic causes of his current mood and symptoms  - Suspect underlying generalized anxiety is contributing to his symptoms  - We discussed therapy, medication, medical marijuana.  Pending the results, we will discuss this further  - Practice deep breathing techniques, slowly increasing exercise routine, and mindfulness  - CBC and Differential; Future  - Comprehensive metabolic panel; Future  - TSH w reflex FT4; Future  - CBC and Differential  - Comprehensive metabolic panel  - TSH w reflex FT4    3. Fatigue, unspecified type  - Plan as above  - Check labs and follow up pending results  - Comprehensive metabolic panel; Future  - TSH w reflex FT4; Future  - Lyme Disease Antibodies (IgG, IgM),; Future  - Comprehensive metabolic  panel  - TSH w reflex FT4  - Lyme Disease Antibodies (IgG, IgM),    4. Epigastric pain  - Suspect PUD vs Gastritits vs. GERD  - Check labs  - Start OMEPRAZOEL  - Weight loss encouraged  - Monitor for triggers.  - Lipid panel; Future  - ULTRASOUND ABDOMEN COMPLETE; Future  - Lipid panel    5. Chronic bilateral low back pain with left-sided sciatica  - Check xray  - Likely benefit from MRI at some point  - Consider referral to PMR or Sports pending results  - Weight loss should prevent from worsening  - X-RAY LUMBAR SPINE 2 OR 3 VIEWS; Future

## 2021-01-08 LAB
ALBUMIN SERPL-MCNC: 4.5 G/DL (ref 3.6–5.1)
ALBUMIN/GLOB SERPL: 2 (CALC) (ref 1–2.5)
ALP SERPL-CCNC: 53 U/L (ref 36–130)
ALT SERPL-CCNC: 32 U/L (ref 9–46)
AST SERPL-CCNC: 17 U/L (ref 10–40)
B BURGDOR IGG SER QL IB: NEGATIVE
B BURGDOR IGM SER QL IB: NEGATIVE
B BURGDOR18KD IGG SER QL IB: ABNORMAL
B BURGDOR23KD IGG SER QL IB: ABNORMAL
B BURGDOR23KD IGM SER QL IB: ABNORMAL
B BURGDOR28KD IGG SER QL IB: ABNORMAL
B BURGDOR30KD IGG SER QL IB: ABNORMAL
B BURGDOR39KD IGG SER QL IB: ABNORMAL
B BURGDOR39KD IGM SER QL IB: REACTIVE
B BURGDOR41KD IGG SER QL IB: REACTIVE
B BURGDOR41KD IGM SER QL IB: ABNORMAL
B BURGDOR45KD IGG SER QL IB: ABNORMAL
B BURGDOR58KD IGG SER QL IB: REACTIVE
B BURGDOR66KD IGG SER QL IB: ABNORMAL
B BURGDOR93KD IGG SER QL IB: ABNORMAL
BASOPHILS # BLD AUTO: 32 CELLS/UL (ref 0–200)
BASOPHILS NFR BLD AUTO: 0.5 %
BILIRUB SERPL-MCNC: 0.8 MG/DL (ref 0.2–1.2)
BUN SERPL-MCNC: 13 MG/DL (ref 7–25)
BUN/CREAT SERPL: NORMAL (CALC) (ref 6–22)
CALCIUM SERPL-MCNC: 9.7 MG/DL (ref 8.6–10.3)
CHLORIDE SERPL-SCNC: 104 MMOL/L (ref 98–110)
CHOLEST SERPL-MCNC: 181 MG/DL
CHOLEST/HDLC SERPL: 5.3 (CALC)
CO2 SERPL-SCNC: 27 MMOL/L (ref 20–32)
CREAT SERPL-MCNC: 0.87 MG/DL (ref 0.6–1.35)
EOSINOPHIL # BLD AUTO: 183 CELLS/UL (ref 15–500)
EOSINOPHIL NFR BLD AUTO: 2.9 %
ERYTHROCYTE [DISTWIDTH] IN BLOOD BY AUTOMATED COUNT: 13.2 % (ref 11–15)
GLOBULIN SER CALC-MCNC: 2.2 G/DL (CALC) (ref 1.9–3.7)
GLUCOSE SERPL-MCNC: 86 MG/DL (ref 65–99)
HCT VFR BLD AUTO: 46.7 % (ref 38.5–50)
HDLC SERPL-MCNC: 34 MG/DL
HGB BLD-MCNC: 15.5 G/DL (ref 13.2–17.1)
LDLC SERPL CALC-MCNC: 117 MG/DL (CALC)
LYMPHOCYTES # BLD AUTO: 2199 CELLS/UL (ref 850–3900)
LYMPHOCYTES NFR BLD AUTO: 34.9 %
MCH RBC QN AUTO: 30.6 PG (ref 27–33)
MCHC RBC AUTO-ENTMCNC: 33.2 G/DL (ref 32–36)
MCV RBC AUTO: 92.3 FL (ref 80–100)
MONOCYTES # BLD AUTO: 504 CELLS/UL (ref 200–950)
MONOCYTES NFR BLD AUTO: 8 %
NEUTROPHILS # BLD AUTO: 3383 CELLS/UL (ref 1500–7800)
NEUTROPHILS NFR BLD AUTO: 53.7 %
NONHDLC SERPL-MCNC: 147 MG/DL (CALC)
PLATELET # BLD AUTO: 215 THOUSAND/UL (ref 140–400)
PMV BLD REES-ECKER: 10.8 FL (ref 7.5–12.5)
POTASSIUM SERPL-SCNC: 4.2 MMOL/L (ref 3.5–5.3)
PROT SERPL-MCNC: 6.7 G/DL (ref 6.1–8.1)
QUEST EGFR NON-AFR. AMERICAN: 113 ML/MIN/1.73M2
RBC # BLD AUTO: 5.06 MILLION/UL (ref 4.2–5.8)
SODIUM SERPL-SCNC: 138 MMOL/L (ref 135–146)
TRIGL SERPL-MCNC: 186 MG/DL
TSH SERPL-ACNC: 3.43 MIU/L (ref 0.4–4.5)
WBC # BLD AUTO: 6.3 THOUSAND/UL (ref 3.8–10.8)

## 2021-01-08 NOTE — TELEPHONE ENCOUNTER
Left VM asking patient to call back to schedule new patient appts with Dr. Lynch, for him and his wife.

## 2021-01-12 ENCOUNTER — HOSPITAL ENCOUNTER (OUTPATIENT)
Dept: RADIOLOGY | Age: 36
Discharge: HOME | End: 2021-01-12
Attending: FAMILY MEDICINE
Payer: COMMERCIAL

## 2021-01-12 DIAGNOSIS — R10.13 EPIGASTRIC PAIN: ICD-10-CM

## 2021-01-12 DIAGNOSIS — M54.42 CHRONIC BILATERAL LOW BACK PAIN WITH LEFT-SIDED SCIATICA: ICD-10-CM

## 2021-01-12 DIAGNOSIS — G89.29 CHRONIC BILATERAL LOW BACK PAIN WITH LEFT-SIDED SCIATICA: ICD-10-CM

## 2021-01-12 PROCEDURE — 72100 X-RAY EXAM L-S SPINE 2/3 VWS: CPT

## 2021-01-12 PROCEDURE — 76700 US EXAM ABDOM COMPLETE: CPT

## 2021-01-20 ENCOUNTER — TELEPHONE (OUTPATIENT)
Dept: PRIMARY CARE | Facility: CLINIC | Age: 36
End: 2021-01-20

## 2021-01-20 ENCOUNTER — TELEPHONE (OUTPATIENT)
Dept: FAMILY MEDICINE | Facility: CLINIC | Age: 36
End: 2021-01-20

## 2021-01-20 NOTE — TELEPHONE ENCOUNTER
Pt would like return phone call regarding breathing issues and inhaler use. He does not want a response through the portal he would like to talk to you over the phone. He is looking into seeing pulm but is interested in getting steroid and matinence inhaler

## 2021-01-20 NOTE — TELEPHONE ENCOUNTER
Melony pt returned your call regarding scheduling new pt appointment for him and his wife.  He can be reached at [phone].

## 2021-01-20 NOTE — TELEPHONE ENCOUNTER
Called patient and left voicemail to discuss possible starting long acting inhaler vs. As needed anxiety med to help with his breathing symptoms.  Asked him to call back tomorrow.

## 2021-01-21 RX ORDER — CLONAZEPAM 1 MG/1
1 TABLET ORAL DAILY PRN
Qty: 15 TABLET | Refills: 0 | Status: SHIPPED | OUTPATIENT
Start: 2021-01-21 | End: 2021-02-04 | Stop reason: SDUPTHER

## 2021-01-21 NOTE — TELEPHONE ENCOUNTER
Sent today.  Start Dulera inhaler.  Try this daily.  Can take Klonopin as needed.  Only use sparingly and ideally should not take this daily.  If anxiety worsening, recommend starting a daily medication such as Lexapro.

## 2021-01-21 NOTE — TELEPHONE ENCOUNTER
Patient is agreeable with plan of long acting inhaler and anxiety med. He asks for it to be sent to the [facility] in his chart.

## 2021-01-26 ENCOUNTER — TELEPHONE (OUTPATIENT)
Dept: FAMILY MEDICINE | Facility: CLINIC | Age: 36
End: 2021-01-26

## 2021-02-02 RX ORDER — CLONAZEPAM 1 MG/1
1 TABLET ORAL DAILY PRN
Qty: 15 TABLET | Refills: 0 | Status: CANCELLED | OUTPATIENT
Start: 2021-02-02 | End: 2021-03-04

## 2021-02-02 NOTE — TELEPHONE ENCOUNTER
Can we reach out to patient about how his symptoms are?  Improvement with the inhaler or Clonazepam?  If he is feeling the need to take the Clonazepam daily to help with anxiety symptoms, should consider starting more appropriate daily medication such as Lexapro or Prozac.  He can certainly send me a message through portal to update me.

## 2021-02-02 NOTE — TELEPHONE ENCOUNTER
Michael called stating he started trial of medication clonazePAM.  He is almost finishe with the prescription.  Do you want him to continue at same dose?  He only has 3 doses left

## 2021-02-02 NOTE — TELEPHONE ENCOUNTER
I called Michael and asked how symptoms were.  I asked him to send Dr. Jensen a message through the [email or web address] portal.

## 2021-02-04 ENCOUNTER — TELEPHONE (OUTPATIENT)
Dept: FAMILY MEDICINE | Facility: CLINIC | Age: 36
End: 2021-02-04

## 2021-02-04 ENCOUNTER — TELEMEDICINE (OUTPATIENT)
Dept: FAMILY MEDICINE | Facility: CLINIC | Age: 36
End: 2021-02-04
Payer: COMMERCIAL

## 2021-02-04 DIAGNOSIS — M54.42 CHRONIC BILATERAL LOW BACK PAIN WITH LEFT-SIDED SCIATICA: ICD-10-CM

## 2021-02-04 DIAGNOSIS — R10.13 EPIGASTRIC PAIN: ICD-10-CM

## 2021-02-04 DIAGNOSIS — R06.02 SHORTNESS OF BREATH: ICD-10-CM

## 2021-02-04 DIAGNOSIS — F41.9 ANXIETY: Primary | ICD-10-CM

## 2021-02-04 DIAGNOSIS — G89.29 CHRONIC BILATERAL LOW BACK PAIN WITH LEFT-SIDED SCIATICA: ICD-10-CM

## 2021-02-04 PROCEDURE — 99213 OFFICE O/P EST LOW 20 MIN: CPT | Mod: 95 | Performed by: FAMILY MEDICINE

## 2021-02-04 RX ORDER — OMEPRAZOLE 40 MG/1
40 CAPSULE, DELAYED RELEASE ORAL
Qty: 30 CAPSULE | Refills: 2 | Status: SHIPPED | OUTPATIENT
Start: 2021-02-04 | End: 2021-10-25 | Stop reason: SDUPTHER

## 2021-02-04 RX ORDER — DULOXETIN HYDROCHLORIDE 30 MG/1
30 CAPSULE, DELAYED RELEASE ORAL DAILY
Qty: 30 CAPSULE | Refills: 3 | Status: SHIPPED | OUTPATIENT
Start: 2021-02-04 | End: 2021-05-28

## 2021-02-04 RX ORDER — CLONAZEPAM 1 MG/1
1 TABLET ORAL DAILY PRN
Qty: 15 TABLET | Refills: 0 | Status: SHIPPED | OUTPATIENT
Start: 2021-02-04 | End: 2022-11-23 | Stop reason: SDUPTHER

## 2021-02-04 NOTE — TELEPHONE ENCOUNTER
Patient sent message through portal earlier this week but it's not there. He took last pill of the clonazepam. He had sent a very long message and is extremely frustrated right now that it is gone.    I made an appt today for him with Dr. Mitchell

## 2021-02-04 NOTE — PROGRESS NOTES
Verification of Patient Location:  The patient affirms they are currently located in the following state:[place]     Request for Consent:  You and I are about to have a telemedicine check-in or visit. This is allowed because you are already my patient, and you have requested it.  This telemedicine visit will be billed to your health insurance or you, if you are self-insured.  You understand you will be responsible for any copayments or coinsurances that apply to your telemedicine visit.  Before starting our telemedicine visit, I am required to get your consent for this virtual check-in or visit by telemedicine. Do you consent?      Patient Response to Request for Consent: Yes    The following have been reviewed and updated as appropriate in this visit:  Tobacco  Allergies  Meds  Problems  Med Hx  Surg Hx  Fam Hx       History reviewed. No pertinent past medical history.    History reviewed. No pertinent surgical history.    Social History     Socioeconomic History   • Marital status:      Spouse name: Not on file   • Number of children: Not on file   • Years of education: Not on file   • Highest education level: Not on file   Occupational History   • Not on file   Social Needs   • Financial resource strain: Not on file   • Food insecurity     Worry: Not on file     Inability: Not on file   • Transportation needs     Medical: Not on file     Non-medical: Not on file   Tobacco Use   • Smoking status: Never Smoker   • Smokeless tobacco: Never Used   Substance and Sexual Activity   • Alcohol use: Yes   • Drug use: Not on file   • Sexual activity: Not on file   Lifestyle   • Physical activity     Days per week: Not on file     Minutes per session: Not on file   • Stress: Not on file   Relationships   • Social connections     Talks on phone: Not on file     Gets together: Not on file     Attends [employer]ist service: Not on file     Active member of club or organization: Not on file     Attends meetings of  clubs or organizations: Not on file     Relationship status: Not on file   • Intimate partner violence     Fear of current or ex partner: Not on file     Emotionally abused: Not on file     Physically abused: Not on file     Forced sexual activity: Not on file   Other Topics Concern   • Not on file   Social History Narrative   • Not on file       Family History   Problem Relation Age of Onset   • Breast cancer Biological Mother    • Diabetes Biological Mother    • Hypertension Biological Mother    • Asthma Biological Father    • Diabetes Biological Father        Patient has no known allergies.    Current Outpatient Medications   Medication Sig Dispense Refill   • albuterol HFA (VENTOLIN HFA) 90 mcg/actuation inhaler Inhale 2 puffs every 6 (six) hours as needed for wheezing. 1 Inhaler 1   • clonazePAM (KlonoPIN) 1 mg tablet Take 1 tablet (1 mg total) by mouth daily as needed for anxiety. 15 tablet 0   • omeprazole (PriLOSEC) 40 mg capsule Take 1 capsule (40 mg total) by mouth daily before breakfast. 30 capsule 2   • mometasone-formoterol (DULERA 100) 100-5 mcg/actuation inhaler Inhale 2 puffs 2 (two) times a day. Rinse mouth with water after use to reduce aftertaste and incidence of candidiasis. Do not swallow. For patients not on a ventilator, a spacer is recommended to be used with this medication/inhaler. 13 g 5   • multivit-min/ferrous fumarate (MULTI VITAMIN ORAL) Take by mouth.       No current facility-administered medications for this visit.        Visit Documentation:  - Patient following up for overall symptoms  - Since last visit he has drastically changed around his diet and digestive symptoms are improving  - OMEPRAZOLE has overall helped with his GI symptoms as well  - CLONAZEPAM has helped with mood swings and anxiety  - He has appointment with Pulm tomorrow to assess his breathing  - He has appointment with Ortho today to assess the back  - He is interested in potentially exploring medical marijuana  certification options  - He has been exercising and changing diet around    A/P:  1. Anxiety  - [place] decision made to start CYMBALTA  - Continue CLONAZEPAM as needed  - Educated on side effects  - continue with healthy lifestyle  - Follow up in 1 months  - Patient to notify me in 2 weeks with update on symptoms    2. Shortness of breath  - See Pulm tomorrow as scheduled    3. Chronic bilateral low back pain with left-sided sciatica  - See Ortho as planned  - Hopefully get some benefit with cymbalta    4. Epigastric pain  - Continue OMEPRAZOLE      Time Spent:  I spent 15 minutes on this date of service performing the following activities: obtaining history, entering orders, documenting, obtaining / reviewing records and providing counseling and education.

## 2021-02-18 ENCOUNTER — TELEMEDICINE (OUTPATIENT)
Dept: FAMILY MEDICINE | Facility: CLINIC | Age: 36
End: 2021-02-18
Payer: COMMERCIAL

## 2021-02-18 DIAGNOSIS — G89.29 CHRONIC BILATERAL LOW BACK PAIN WITH LEFT-SIDED SCIATICA: ICD-10-CM

## 2021-02-18 DIAGNOSIS — M54.42 CHRONIC BILATERAL LOW BACK PAIN WITH LEFT-SIDED SCIATICA: ICD-10-CM

## 2021-02-18 DIAGNOSIS — F41.9 ANXIETY: Primary | ICD-10-CM

## 2021-02-18 DIAGNOSIS — R06.02 SHORTNESS OF BREATH: ICD-10-CM

## 2021-02-18 PROCEDURE — 99213 OFFICE O/P EST LOW 20 MIN: CPT | Mod: 95 | Performed by: FAMILY MEDICINE

## 2021-02-18 RX ORDER — METHOCARBAMOL 500 MG/1
TABLET, FILM COATED ORAL
COMMUNITY
Start: 2021-02-04 | End: 2022-11-23 | Stop reason: ALTCHOICE

## 2021-02-18 RX ORDER — GABAPENTIN 300 MG/1
CAPSULE ORAL
COMMUNITY
Start: 2021-02-04 | End: 2021-06-02 | Stop reason: ALTCHOICE

## 2021-02-18 NOTE — PROGRESS NOTES
Verification of Patient Location:  The patient affirms they are currently located in the following state:[place]     Request for Consent:  You and I are about to have a telemedicine check-in or visit. This is allowed because you are already my patient, and you have requested it.  This telemedicine visit will be billed to your health insurance or you, if you are self-insured.  You understand you will be responsible for any copayments or coinsurances that apply to your telemedicine visit.  Before starting our telemedicine visit, I am required to get your consent for this virtual check-in or visit by telemedicine. Do you consent?      Patient Response to Request for Consent: Yes    The following have been reviewed and updated as appropriate in this visit:         Visit Documentation:  - Patient presenting to the office for follow up of anxiety and back pain  - Since last visit he saw Ortho spine  - MRI was ordered which showed degeneration, disc bulging, spondylosis  - He has a consult with a surgeon coming up to explore options - patient leaning towards conservative approaches for now  - Was prescribed Gabapentin and muscle relaxant but has not taken those yet  - Completed course of steroids for pain    - Also saw Pulm since last visit - was discontinued off the DULERA  - Has albuterol has needed but testing was stable  - Breathing symptoms thought to be 2/2 viral illness ?Covid from previous visits  - Breathing is currently stable    - Since last visit, has been taking CYMBALTA  - Overall feels that anxiety is lessened  - Wife concurs with this and sees even a bigger change  - Possibly less nerve pain as well  - Sleep is stable  - No side effects that he knows of    A/P:  1. Anxiety  - Improved  - Continue CYMBALTA at current dose  - Patient to notify me in a few weeks with update on his symptoms  - Could consider increasing dose at that time  - Continue with KLONOPIN on as needed basis  - Follow up with me in a  few months    2. Shortness of breath  - Much improved  - Continue Pulm follow up as needed  - ALBUTEROL as needed  - Stop DULERA    3. Chronic bilateral low back pain with left-sided sciatica  - Continue follow up with Ortho  - Long discussion about options  - Exploring medical cannabis for pain relief  - Follow up with me after seeing Ortho      Time Spent:  I spent 24 minutes on this date of service performing the following activities: obtaining history, entering orders, documenting, preparing for visit, obtaining / reviewing records, independently reviewing study/studies and communicating results.

## 2021-03-31 DIAGNOSIS — Z23 ENCOUNTER FOR IMMUNIZATION: ICD-10-CM

## 2021-05-28 DIAGNOSIS — F41.9 ANXIETY: Primary | ICD-10-CM

## 2021-05-28 RX ORDER — DULOXETIN HYDROCHLORIDE 30 MG/1
CAPSULE, DELAYED RELEASE ORAL
Qty: 30 CAPSULE | Refills: 3 | Status: SHIPPED | OUTPATIENT
Start: 2021-05-28 | End: 2021-06-02 | Stop reason: SDUPTHER

## 2021-05-28 NOTE — TELEPHONE ENCOUNTER
Medicine Refill Request    Last Office Visit: 1/5/2021  Last Telemedicine Visit: 2/18/2021 Pierce Mitchell DO    Next Office Visit: 6/2/2021  Next Telemedicine Visit: Visit date not found         Current Outpatient Medications:   •  albuterol HFA (VENTOLIN HFA) 90 mcg/actuation inhaler, Inhale 2 puffs every 6 (six) hours as needed for wheezing., Disp: 1 Inhaler, Rfl: 1  •  clonazePAM (KlonoPIN) 1 mg tablet, Take 1 tablet (1 mg total) by mouth daily as needed for anxiety., Disp: 15 tablet, Rfl: 0  •  DULoxetine (CYMBALTA) 30 mg capsule, Take 1 capsule (30 mg total) by mouth daily., Disp: 30 capsule, Rfl: 3  •  gabapentin (NEURONTIN) 300 mg capsule, PLEASE SEE ATTACHED FOR DETAILED DIRECTIONS, Disp: , Rfl:   •  methocarbamoL (ROBAXIN) 500 mg tablet, TAKE 1 TABLET BY MOUTH EVERY 6 HOURS AS NEEDED FOR SEVERE SPASM FOR 7 DAYS, Disp: , Rfl:   •  multivit-min/ferrous fumarate (MULTI VITAMIN ORAL), Take by mouth., Disp: , Rfl:   •  omeprazole (PriLOSEC) 40 mg capsule, Take 1 capsule (40 mg total) by mouth daily before breakfast., Disp: 30 capsule, Rfl: 2      BP Readings from Last 3 Encounters:   01/05/21 (!) 142/90   01/02/21 (!) 146/90   12/30/19 140/80       Recent Lab results:  Lab Results   Component Value Date    CHOL 181 01/07/2021   ,   Lab Results   Component Value Date    HDL 34 (L) 01/07/2021   ,   Lab Results   Component Value Date    LDLCALC 117 (H) 01/07/2021   ,   Lab Results   Component Value Date    TRIG 186 (H) 01/07/2021        Lab Results   Component Value Date    GLUCOSE 86 01/07/2021   , No results found for: HGBA1C      Lab Results   Component Value Date    CREATININE 0.87 01/07/2021       Lab Results   Component Value Date    TSH 3.43 01/07/2021

## 2021-06-02 ENCOUNTER — TELEMEDICINE (OUTPATIENT)
Dept: FAMILY MEDICINE | Facility: CLINIC | Age: 36
End: 2021-06-02
Payer: COMMERCIAL

## 2021-06-02 DIAGNOSIS — F41.9 ANXIETY: Primary | ICD-10-CM

## 2021-06-02 PROCEDURE — 99213 OFFICE O/P EST LOW 20 MIN: CPT | Mod: 95 | Performed by: FAMILY MEDICINE

## 2021-06-02 RX ORDER — DULOXETIN HYDROCHLORIDE 20 MG/1
20 CAPSULE, DELAYED RELEASE ORAL
Qty: 14 CAPSULE | Refills: 0 | Status: SHIPPED | OUTPATIENT
Start: 2021-06-02 | End: 2022-01-05 | Stop reason: ALTCHOICE

## 2021-06-02 ASSESSMENT — ENCOUNTER SYMPTOMS
NERVOUS/ANXIOUS: 0
DIZZINESS: 0
HEADACHES: 0
SHORTNESS OF BREATH: 0
DECREASED CONCENTRATION: 0
UNEXPECTED WEIGHT CHANGE: 0
COUGH: 0
PALPITATIONS: 0
CHILLS: 0
DYSPHORIC MOOD: 0
FEVER: 0
FATIGUE: 0

## 2021-06-02 NOTE — PROGRESS NOTES
Verification of Patient Location:  The patient affirms they are currently located in the following state: [place]    Request for Consent:    Audio Only Encounter   You and I are about to have a telemedicine check-in or visit. This is allowed because you have requested it. This telemedicine visit will be billed to your health insurance or you, if you are self-insured. You understand you will be responsible for any copayments or coinsurances that apply to your telemedicine visit. Before starting our telemedicine visit, I am required to get your consent for this virtual check-in or visit by telemedicine. Do you consent?    Patient Response to Request for Consent:  Yes      Visit Documentation:  Subjective     Patient ID: Michael Lew is a 35 y.o. male.  1985      - Patient presenting for follow up of anxiety  - Anxiety - Stable.  Taking CYMBALTA daily.  Felt a benefit from this but interested in coming down off of this.  He has been seeing Dr. Shaffer as well.  Has not taken KLONOPIN in a long time.  He has been working on own health and feels like he is in a better place now  - GERD - Stable.  Taking OMEPRAZOLE daily.  Symptoms much improved.      The following have been reviewed and updated as appropriate in this visit:  Tobacco  Allergies  Meds  Problems  Med Hx  Surg Hx  Fam Hx        History reviewed. No pertinent past medical history.    History reviewed. No pertinent surgical history.    Social History     Socioeconomic History   • Marital status:      Spouse name: Not on file   • Number of children: Not on file   • Years of education: Not on file   • Highest education level: Not on file   Occupational History   • Not on file   Tobacco Use   • Smoking status: Never Smoker   • Smokeless tobacco: Never Used   Substance and Sexual Activity   • Alcohol use: Yes   • Drug use: Not on file   • Sexual activity: Not on file   Other Topics Concern   • Not on file   Social History Narrative   • Not on  file     Social Determinants of Health     Financial Resource Strain:    • Difficulty of Paying Living Expenses:    Food Insecurity:    • Worried About Running Out of Food in the Last Year:    • Ran Out of Food in the Last Year:    Transportation Needs:    • Lack of Transportation (Medical):    • Lack of Transportation (Non-Medical):    Physical Activity:    • Days of Exercise per Week:    • Minutes of Exercise per Session:    Stress:    • Feeling of Stress :    Social Connections:    • Frequency of Communication with Friends and Family:    • Frequency of Social Gatherings with Friends and Family:    • Attends [employer] Services:    • Active Member of Clubs or Organizations:    • Attends Club or Organization Meetings:    • Marital Status:    Intimate Partner Violence:    • Fear of Current or Ex-Partner:    • Emotionally Abused:    • Physically Abused:    • Sexually Abused:        Family History   Problem Relation Age of Onset   • Breast cancer Biological Mother    • Diabetes Biological Mother    • Hypertension Biological Mother    • Asthma Biological Father    • Diabetes Biological Father        Patient has no known allergies.    Current Outpatient Medications   Medication Sig Dispense Refill   • DULoxetine (CYMBALTA) 20 mg capsule Take 1 capsule (20 mg total) by mouth once daily. 14 capsule 0   • albuterol HFA (VENTOLIN HFA) 90 mcg/actuation inhaler Inhale 2 puffs every 6 (six) hours as needed for wheezing. 1 Inhaler 1   • clonazePAM (KlonoPIN) 1 mg tablet Take 1 tablet (1 mg total) by mouth daily as needed for anxiety. 15 tablet 0   • methocarbamoL (ROBAXIN) 500 mg tablet TAKE 1 TABLET BY MOUTH EVERY 6 HOURS AS NEEDED FOR SEVERE SPASM FOR 7 DAYS     • multivit-min/ferrous fumarate (MULTI VITAMIN ORAL) Take by mouth.     • omeprazole (PriLOSEC) 40 mg capsule Take 1 capsule (40 mg total) by mouth daily before breakfast. 30 capsule 2     No current facility-administered medications for this visit.       Review of  Systems   Constitutional: Negative for chills, fatigue, fever and unexpected weight change.   Eyes: Negative for visual disturbance.   Respiratory: Negative for cough and shortness of breath.    Cardiovascular: Negative for chest pain, palpitations and leg swelling.   Neurological: Negative for dizziness and headaches.   Psychiatric/Behavioral: Negative for decreased concentration, dysphoric mood and suicidal ideas. The patient is not nervous/anxious.          Assessment/Plan   Diagnoses and all orders for this visit:    Anxiety (Primary)  -     DULoxetine (CYMBALTA) 20 mg capsule; Take 1 capsule (20 mg total) by mouth once daily.    1. Anxiety  - [place] decision made to wean down on Cymbalta   - Decrease to 20 mg daily for 1 week  - Then decrease to 20 mg every other day for 1 week  - Then can stop if doing well  - Continue follow up with Dr. Shaffer   - Follow up with me for wellness exam  - DULoxetine (CYMBALTA) 20 mg capsule; Take 1 capsule (20 mg total) by mouth once daily.  Dispense: 14 capsule; Refill: 0    Time Spent:  I spent 15 minutes on this date of service performing the following activities: obtaining history, entering orders, documenting and providing counseling and education.

## 2021-07-30 ENCOUNTER — TELEMEDICINE (OUTPATIENT)
Dept: FAMILY MEDICINE | Facility: CLINIC | Age: 36
End: 2021-07-30
Payer: COMMERCIAL

## 2021-07-30 DIAGNOSIS — J03.90 EXUDATIVE TONSILLITIS: Primary | ICD-10-CM

## 2021-07-30 PROCEDURE — 99213 OFFICE O/P EST LOW 20 MIN: CPT | Mod: 95 | Performed by: FAMILY MEDICINE

## 2021-07-30 RX ORDER — AMOXICILLIN 875 MG/1
875 TABLET, FILM COATED ORAL 2 TIMES DAILY
Qty: 20 TABLET | Refills: 0 | Status: SHIPPED | OUTPATIENT
Start: 2021-07-30 | End: 2021-08-09

## 2021-07-30 ASSESSMENT — ENCOUNTER SYMPTOMS
SORE THROAT: 1
TROUBLE SWALLOWING: 1
SWOLLEN GLANDS: 1

## 2021-07-30 NOTE — PROGRESS NOTES
Verification of Patient Location:  The patient affirms they are currently located in the following state: [place]    Request for Consent:    Audio and Video Encounter   David, my name is Olive PEÑALOZA DO Jackie.  Before we proceed, can you please verify your identification by telling me your full name and date of birth?  Can you tell me who is in the room with you?    You and I are about to have a telemedicine check-in or visit because you have requested it.  This is a live video-conference.  I am a real person, speaking to you in real time.  There is no one else with me on the video-conference.  However, when we use ([employer], [employer], etc) it is important for you to know that the video-conference may not be secure or private.  I am not recording this conversation and I am asking you not to record it.  This telemedicine visit will be billed to your health insurance or you, if you are self-insured.  You understand you will be responsible for any copayments or coinsurances that apply to your telemedicine visit.  Communication platform used for this encounter:  [employer] Video Visit (with Zoom integration)     Before starting our telemedicine visit, I am required to get your consent for this virtual check-in or visit by telemedicine. Do you consent?      Patient Response to Request for Consent:  Yes      Visit Documentation:  Subjective     Patient ID: Michael Lew is a 36 y.o. male.  1985      Sore Throat   This is a new problem. The current episode started in the past 7 days. Maximum temperature: low grade. The fever has been present for 1 to 2 days. Associated symptoms include swollen glands and trouble swallowing. Associated symptoms comments: Now with white spots on tonsils. He has tried NSAIDs for the symptoms. The treatment provided mild relief.       The following have been reviewed and updated as appropriate in this visit:       Review of Systems   HENT: Positive for sore throat and trouble swallowing.           Assessment/Plan   Diagnoses and all orders for this visit:    Exudative tonsillitis (Primary)  -     amoxicillin (AMOXIL) 875 mg tablet; Take 1 tablet (875 mg total) by mouth 2 (two) times a day for 10 days.    Patient will notify me with any questions or issues. Educated patient on newly prescribed medication including side effects. Patient verbalized understanding.  Treatment goals reviewed   Will treat as strep.    Time Spent:  I spent 13 minutes on this date of service performing the following activities: obtaining history, entering orders, documenting, preparing for visit, obtaining / reviewing records, providing counseling and education, independently reviewing study/studies, communicating results and coordinating care.

## 2021-10-25 NOTE — TELEPHONE ENCOUNTER
Medicine Refill Request    Last Office Visit: Visit date not found  Last Telemedicine Visit: Visit date not found    Next Office Visit: 1/5/2022  Next Telemedicine Visit: Visit date not found         Current Outpatient Medications:   •  albuterol HFA (VENTOLIN HFA) 90 mcg/actuation inhaler, Inhale 2 puffs every 6 (six) hours as needed for wheezing., Disp: 1 Inhaler, Rfl: 1  •  clonazePAM (KlonoPIN) 1 mg tablet, Take 1 tablet (1 mg total) by mouth daily as needed for anxiety., Disp: 15 tablet, Rfl: 0  •  DULoxetine (CYMBALTA) 20 mg capsule, Take 1 capsule (20 mg total) by mouth once daily., Disp: 14 capsule, Rfl: 0  •  methocarbamoL (ROBAXIN) 500 mg tablet, TAKE 1 TABLET BY MOUTH EVERY 6 HOURS AS NEEDED FOR SEVERE SPASM FOR 7 DAYS, Disp: , Rfl:   •  multivit-min/ferrous fumarate (MULTI VITAMIN ORAL), Take by mouth., Disp: , Rfl:   •  omeprazole (PriLOSEC) 40 mg capsule, Take 1 capsule (40 mg total) by mouth daily before breakfast., Disp: 30 capsule, Rfl: 2      BP Readings from Last 3 Encounters:   01/05/21 (!) 142/90   01/02/21 (!) 146/90   12/30/19 140/80       Recent Lab results:  Lab Results   Component Value Date    CHOL 181 01/07/2021   ,   Lab Results   Component Value Date    HDL 34 (L) 01/07/2021   ,   Lab Results   Component Value Date    LDLCALC 117 (H) 01/07/2021   ,   Lab Results   Component Value Date    TRIG 186 (H) 01/07/2021        Lab Results   Component Value Date    GLUCOSE 86 01/07/2021   , No results found for: HGBA1C      Lab Results   Component Value Date    CREATININE 0.87 01/07/2021       Lab Results   Component Value Date    TSH 3.43 01/07/2021

## 2021-10-26 RX ORDER — OMEPRAZOLE 40 MG/1
40 CAPSULE, DELAYED RELEASE ORAL
Qty: 30 CAPSULE | Refills: 2 | Status: SHIPPED | OUTPATIENT
Start: 2021-10-26 | End: 2022-02-07

## 2021-11-10 ENCOUNTER — HOSPITAL ENCOUNTER (OUTPATIENT)
Facility: CLINIC | Age: 36
Discharge: ANOTHER HOSPITAL | End: 2021-11-10
Attending: FAMILY MEDICINE
Payer: COMMERCIAL

## 2021-11-10 ENCOUNTER — APPOINTMENT (EMERGENCY)
Dept: RADIOLOGY | Facility: HOSPITAL | Age: 36
End: 2021-11-10
Payer: COMMERCIAL

## 2021-11-10 ENCOUNTER — HOSPITAL ENCOUNTER (EMERGENCY)
Facility: HOSPITAL | Age: 36
Discharge: HOME | End: 2021-11-10
Attending: EMERGENCY MEDICINE
Payer: COMMERCIAL

## 2021-11-10 VITALS
TEMPERATURE: 98.9 F | DIASTOLIC BLOOD PRESSURE: 90 MMHG | HEART RATE: 93 BPM | SYSTOLIC BLOOD PRESSURE: 160 MMHG | OXYGEN SATURATION: 100 %

## 2021-11-10 VITALS
HEART RATE: 84 BPM | DIASTOLIC BLOOD PRESSURE: 75 MMHG | OXYGEN SATURATION: 100 % | WEIGHT: 280 LBS | RESPIRATION RATE: 16 BRPM | TEMPERATURE: 97.3 F | HEIGHT: 72 IN | BODY MASS INDEX: 37.93 KG/M2 | SYSTOLIC BLOOD PRESSURE: 141 MMHG

## 2021-11-10 DIAGNOSIS — R07.9 CHEST PAIN OF UNCERTAIN ETIOLOGY: Primary | ICD-10-CM

## 2021-11-10 DIAGNOSIS — R07.9 CHEST PAIN, UNSPECIFIED TYPE: Primary | ICD-10-CM

## 2021-11-10 LAB
ALBUMIN SERPL-MCNC: 4.3 G/DL (ref 3.4–5)
ALP SERPL-CCNC: 52 IU/L (ref 35–126)
ALT SERPL-CCNC: 26 IU/L (ref 16–63)
ANION GAP SERPL CALC-SCNC: 10 MEQ/L (ref 3–15)
AST SERPL-CCNC: 27 IU/L (ref 15–41)
BASOPHILS # BLD: 0.04 K/UL (ref 0.01–0.1)
BASOPHILS NFR BLD: 0.4 %
BILIRUB SERPL-MCNC: 1.5 MG/DL (ref 0.3–1.2)
BUN SERPL-MCNC: 16 MG/DL (ref 8–20)
CALCIUM SERPL-MCNC: 9.4 MG/DL (ref 8.9–10.3)
CHLORIDE SERPL-SCNC: 102 MEQ/L (ref 98–109)
CO2 SERPL-SCNC: 24 MEQ/L (ref 22–32)
CREAT SERPL-MCNC: 1 MG/DL (ref 0.8–1.3)
D DIMER PPP IA.FEU-MCNC: 0.31 UG/ML FEU (ref 0–0.5)
DIFFERENTIAL METHOD BLD: NORMAL
EOSINOPHIL # BLD: 0.29 K/UL (ref 0.04–0.54)
EOSINOPHIL NFR BLD: 3.1 %
ERYTHROCYTE [DISTWIDTH] IN BLOOD BY AUTOMATED COUNT: 12.8 % (ref 11.6–14.4)
GFR SERPL CREATININE-BSD FRML MDRD: >60 ML/MIN/1.73M*2
GLUCOSE SERPL-MCNC: 93 MG/DL (ref 70–99)
HCT VFR BLDCO AUTO: 47.3 % (ref 40.1–51)
HGB BLD-MCNC: 16.1 G/DL (ref 13.7–17.5)
IMM GRANULOCYTES # BLD AUTO: 0.02 K/UL (ref 0–0.08)
IMM GRANULOCYTES NFR BLD AUTO: 0.2 %
LYMPHOCYTES # BLD: 3.32 K/UL (ref 1.2–3.5)
LYMPHOCYTES NFR BLD: 35.3 %
MCH RBC QN AUTO: 30.8 PG (ref 28–33.2)
MCHC RBC AUTO-ENTMCNC: 34 G/DL (ref 32.2–36.5)
MCV RBC AUTO: 90.4 FL (ref 83–98)
MONOCYTES # BLD: 0.56 K/UL (ref 0.3–1)
MONOCYTES NFR BLD: 6 %
NEUTROPHILS # BLD: 5.18 K/UL (ref 1.7–7)
NEUTS SEG NFR BLD: 55 %
NRBC BLD-RTO: 0 %
PDW BLD AUTO: 9.9 FL (ref 9.4–12.4)
PLATELET # BLD AUTO: 230 K/UL (ref 150–350)
POTASSIUM SERPL-SCNC: 4.6 MEQ/L (ref 3.6–5.1)
PROT SERPL-MCNC: 7.2 G/DL (ref 6–8.2)
RBC # BLD AUTO: 5.23 M/UL (ref 4.5–5.8)
SODIUM SERPL-SCNC: 136 MEQ/L (ref 136–144)
TROPONIN I SERPL-MCNC: <0.02 NG/ML
WBC # BLD AUTO: 9.41 K/UL (ref 3.8–10.5)

## 2021-11-10 PROCEDURE — 99283 EMERGENCY DEPT VISIT LOW MDM: CPT | Mod: 25

## 2021-11-10 PROCEDURE — 93000 ELECTROCARDIOGRAM COMPLETE: CPT | Performed by: FAMILY MEDICINE

## 2021-11-10 PROCEDURE — 80053 COMPREHEN METABOLIC PANEL: CPT | Performed by: EMERGENCY MEDICINE

## 2021-11-10 PROCEDURE — 99213 OFFICE O/P EST LOW 20 MIN: CPT | Performed by: FAMILY MEDICINE

## 2021-11-10 PROCEDURE — 36415 COLL VENOUS BLD VENIPUNCTURE: CPT

## 2021-11-10 PROCEDURE — 93005 ELECTROCARDIOGRAM TRACING: CPT | Performed by: EMERGENCY MEDICINE

## 2021-11-10 PROCEDURE — 85379 FIBRIN DEGRADATION QUANT: CPT | Performed by: PHYSICIAN ASSISTANT

## 2021-11-10 PROCEDURE — 84484 ASSAY OF TROPONIN QUANT: CPT | Performed by: EMERGENCY MEDICINE

## 2021-11-10 PROCEDURE — S9088 SERVICES PROVIDED IN URGENT: HCPCS | Performed by: FAMILY MEDICINE

## 2021-11-10 PROCEDURE — 85025 COMPLETE CBC W/AUTO DIFF WBC: CPT | Performed by: EMERGENCY MEDICINE

## 2021-11-10 PROCEDURE — 71046 X-RAY EXAM CHEST 2 VIEWS: CPT

## 2021-11-10 PROCEDURE — 93005 ELECTROCARDIOGRAM TRACING: CPT

## 2021-11-10 ASSESSMENT — ENCOUNTER SYMPTOMS
VOMITING: 0
LIGHT-HEADEDNESS: 0
WEAKNESS: 0
COUGH: 0
NUMBNESS: 0
VOMITING: 0
ABDOMINAL PAIN: 0
ABDOMINAL PAIN: 0
FEVER: 0
DIZZINESS: 0
CHEST TIGHTNESS: 1
NAUSEA: 1
SHORTNESS OF BREATH: 0
COLOR CHANGE: 0
CHILLS: 0
HEADACHES: 0
DIARRHEA: 0
FEVER: 0
SHORTNESS OF BREATH: 0

## 2021-11-11 LAB
ATRIAL RATE: 74
P AXIS: 47
PR INTERVAL: 104
QRS DURATION: 100
QT INTERVAL: 392
QTC CALCULATION(BAZETT): 435
R AXIS: 26
T WAVE AXIS: 29
VENTRICULAR RATE: 74

## 2021-11-11 NOTE — ED PROVIDER NOTES
"History  Chief Complaint   Patient presents with   • Shoulder Pain   • Hypertension       History provided by:  Patient   used: No    Chest Pain  Pain location:  L lateral chest (left shoulder)  Pain severity:  Mild  Onset quality:  Sudden  Duration:  5 days  Context comment:  Woke up saturday morning and felt tightness in the left shoulder.  Radiates into the left chest.  \"Didn't feel right\"  Multiple recent life stressors  Ineffective treatments: medical marijuana.  Associated symptoms: nausea (today)    Associated symptoms: no abdominal pain, no dizziness, no fever, no shortness of breath and no vomiting    Associated symptoms comment:  Flushed, sweaty  Risk factors comment:  FHx: DM, HTN, brother had HTN in his 40s      History reviewed. No pertinent past medical history.    History reviewed. No pertinent surgical history.    Family History   Problem Relation Age of Onset   • Breast cancer Biological Mother    • Diabetes Biological Mother    • Hypertension Biological Mother    • Asthma Biological Father    • Diabetes Biological Father        Social History     Tobacco Use   • Smoking status: Never Smoker   • Smokeless tobacco: Never Used   Substance Use Topics   • Alcohol use: Yes   • Drug use: Not on file       Review of Systems   Constitutional: Negative for chills and fever.   Respiratory: Negative for shortness of breath.    Cardiovascular: Positive for chest pain.   Gastrointestinal: Positive for nausea (today). Negative for abdominal pain and vomiting.   Neurological: Negative for dizziness.       Physical Exam  ED Triage Vitals [11/10/21 1901]   Temp Heart Rate Resp BP SpO2   37.2 °C (98.9 °F) 93 -- (!) 160/90 100 %      Temp src Heart Rate Source Patient Position BP Location FiO2 (%) (Set)   -- -- -- -- --       Physical Exam  Vitals and nursing note reviewed.   Constitutional:       General: He is not in acute distress.     Appearance: Normal appearance. He is not ill-appearing. "   Cardiovascular:      Rate and Rhythm: Normal rate and regular rhythm.      Pulses: Normal pulses. No decreased pulses.      Heart sounds: Normal heart sounds.      Comments: Repeat BP: 150/86  Pulmonary:      Effort: Pulmonary effort is normal. No tachypnea or respiratory distress.      Breath sounds: Normal breath sounds. No decreased breath sounds, wheezing, rhonchi or rales.   Neurological:      Mental Status: He is alert.   Psychiatric:         Attention and Perception: Attention normal.         Mood and Affect: Mood normal.         Speech: Speech normal.         Behavior: Behavior is cooperative.         Thought Content: Thought content normal.         Judgment: Judgment normal.           Procedures  Procedures    UC Course  Clinical Impressions as of 11/10/21 2003   Chest pain of uncertain etiology       MDM  Number of Diagnoses or Management Options  Chest pain of uncertain etiology  Diagnosis management comments:   Pt presents with: left sided shoulder tightness, left sided chest pressure  Onset: 5 days ago, but worsened this evening  Associated symptoms: nausea, diaphoresis    Vitals are stable. BP elevated.      Exam is WNL.     ECG: NSR.  Nl axis.  No St/T wave changes.      Dispo:  Given his symptoms, discussed with patient being seen in the ED for cardiac enzymes.     He agrees.  I spoke with ER at [place].                  Yanet Mckenzie MD  11/10/21 2008

## 2021-11-11 NOTE — ED PROVIDER NOTES
Emergency Medicine Note  HPI   HISTORY OF PRESENT ILLNESS     36-year-old male with past medical history of anxiety, lumbar pain presents with intermittent left-sided chest pain.  Patient states for the past 5 days he had intermittent left-sided chest pain.  States at some points it goes down his left arm and into his armpit.  States he feels some chest tightness across the top of his chest.  Also a pinching in the left side of his neck and left armpit.  Patient does admit to a lot of stress recently.  Denies fever, chills, lightheadedness, dizziness, headache, shortness of breath, cough, abdominal pain, nausea, weakness.            Patient History   PAST HISTORY     Reviewed from Nursing Triage:      Past Medical History:   Diagnosis Date   • Anxiety    • Lumbar pain     chronic   • Panic attacks        History reviewed. No pertinent surgical history.    Family History   Problem Relation Age of Onset   • Breast cancer Biological Mother    • Diabetes Biological Mother    • Hypertension Biological Mother    • Asthma Biological Father    • Diabetes Biological Father        Social History     Tobacco Use   • Smoking status: Never Smoker   • Smokeless tobacco: Never Used   Substance Use Topics   • Alcohol use: Yes   • Drug use: Yes     Types: Marijuana         Review of Systems   REVIEW OF SYSTEMS     Review of Systems   Constitutional: Negative for fever.   Respiratory: Positive for chest tightness. Negative for cough and shortness of breath.    Cardiovascular: Positive for chest pain.   Gastrointestinal: Negative for abdominal pain, diarrhea and vomiting.   Skin: Negative for color change.   Neurological: Negative for weakness, light-headedness, numbness and headaches.         VITALS     ED Vitals    Date/Time Temp Pulse Resp BP SpO2 [facility]   11/10/21 2129 -- 84 16 141/75 100 % PB   11/10/21 2007 36.3 °C (97.3 °F) 89 18 147/76 99 % NIKI        Pulse Ox %: 100 % (11/10/21 2150)  Pulse Ox Interpretation: Normal (11/10/21  2150)  Heart Rate: 74 (11/10/21 2150)  Rhythm Strip Interpretation: Normal Sinus Rhythm (11/10/21 2150)     Physical Exam   PHYSICAL EXAM     Physical Exam  Vitals and nursing note reviewed.   Constitutional:       General: He is not in acute distress.     Appearance: He is well-developed.   HENT:      Head: Atraumatic.   Eyes:      Conjunctiva/sclera: Conjunctivae normal.   Cardiovascular:      Rate and Rhythm: Normal rate and regular rhythm.   Pulmonary:      Effort: Pulmonary effort is normal.      Breath sounds: Normal breath sounds.   Chest:      Chest wall: No tenderness.   Abdominal:      General: Bowel sounds are normal.      Palpations: Abdomen is soft.      Tenderness: There is no abdominal tenderness.   Musculoskeletal:         General: No deformity.   Skin:     General: Skin is warm and dry.   Neurological:      General: No focal deficit present.      Mental Status: He is alert.   Psychiatric:         Behavior: Behavior normal.           PROCEDURES     Procedures     DATA     Results     Procedure Component Value Units Date/Time    CBC and differential [[id number]] Collected: 11/10/21 2102    Specimen: Blood, Venous Updated: 11/10/21 2112     WBC 9.41 K/uL      RBC 5.23 M/uL      Hemoglobin 16.1 g/dL      Hematocrit 47.3 %      MCV 90.4 fL      MCH 30.8 pg      MCHC 34.0 g/dL      RDW 12.8 %      Platelets 230 K/uL      MPV 9.9 fL      Differential Type Auto     nRBC 0.0 %      Immature Granulocytes 0.2 %      Neutrophils 55.0 %      Lymphocytes 35.3 %      Monocytes 6.0 %      Eosinophils 3.1 %      Basophils 0.4 %      Immature Granulocytes, Absolute 0.02 K/uL      Neutrophils, Absolute 5.18 K/uL      Lymphocytes, Absolute 3.32 K/uL      Monocytes, Absolute 0.56 K/uL      Eosinophils, Absolute 0.29 K/uL      Basophils, Absolute 0.04 K/uL     D-dimer, quantitative [[id number]]  (Normal) Collected: 11/10/21 2020    Specimen: Blood, Venous Updated: 11/10/21 2058     D-Dimer, Quant 0.31 ug/mL FEU       Comment: The D-Dimer assay can be used as an aid in the diagnosis of DVT or PE. The test can not be used by itself to exclude DVT or PE. When used as a diagnostic aid, the cutoff value is the same as the reference range: <0.5 ug/ml FEU.       Comprehensive metabolic panel [[id number]]  (Abnormal) Collected: 11/10/21 2020    Specimen: Blood, Venous Updated: 11/10/21 2052     Sodium 136 mEQ/L      Potassium 4.6 mEQ/L      Comment: Results obtained on plasma. Plasma Potassium values may be up to 0.4 mEQ/L less than serum values. The differences may be greater for patients with high platelet or white cell counts.  MODERATE HEMOLYSIS, RESULT MAY BE INCREASED.        Chloride 102 mEQ/L      CO2 24 mEQ/L      BUN 16 mg/dL      Creatinine 1.0 mg/dL      Glucose 93 mg/dL      Calcium 9.4 mg/dL      AST (SGOT) 27 IU/L      Comment: MODERATE HEMOLYSIS, RESULT MAY BE INCREASED.        ALT (SGPT) 26 IU/L      Comment: MODERATE HEMOLYSIS, RESULT MAY BE INCREASED.        Alkaline Phosphatase 52 IU/L      Total Protein 7.2 g/dL      Comment: Test performed on plasma which typically contains approximately 0.4 g/dL more protein than serum.        Albumin 4.3 g/dL      Bilirubin, Total 1.5 mg/dL      Comment: MODERATE HEMOLYSIS, RESULT MAY BE INCREASED.        eGFR >60.0 mL/min/1.73m*2      Anion Gap 10 mEQ/L     Troponin I [[id number]]  (Normal) Collected: 11/10/21 2020    Specimen: Blood, Venous Updated: 11/10/21 2052     Troponin I <0.02 ng/mL     RAINBOW RED [[id number]] Collected: 11/10/21 2020    Specimen: Blood, Venous Updated: 11/10/21 2024    RAINBOW LT BLUE [[id number]] Collected: 11/10/21 2020    Specimen: Blood, Venous Updated: 11/10/21 2024    Coleman Draw Panel [[id number]] Collected: 11/10/21 2020    Specimen: Blood, Venous Updated: 11/10/21 2024    Narrative:      The following orders were created for panel order Coleman Draw Panel.  Procedure                               Abnormality         Status                      ---------                               -----------         ------                     ELDA RED[[id number]]                                      In process                 ELDA DAVIS BLUE[[id number]]                                  In process                 RAINBOW GOLD[[id number]]                                     In process                   Please view results for these tests on the individual orders.    RAINBOW GOLD [[id number]] Collected: 11/10/21 2020    Specimen: Blood, Venous Updated: 11/10/21 2024          Imaging Results          X-RAY CHEST 2 VIEWS (Preliminary result)  Result time 11/10/21 21:23:09    ED Interpretation    nad                              ECG 12 lead   ED Interpretation   Normal sinus rhythm rate 74 bpm normal axis narrow QRS no ST segment changes          Scoring tools                                 ED Course & MDM   MDM / ED COURSE and CLINICAL IMPRESSIONS     MDM    ED Course as of 11/10/21 2200   Wed Nov 10, 2021   2102 D-Dimer, Quant: 0.31 [DB]   2102 Troponin I: <0.02 [DB]      ED Course User Index  [DB] Delaney Meyers PA C         Clinical Impressions as of 11/10/21 2200   Chest pain, unspecified type            Delaney Meyers PA C  11/10/21 2200

## 2021-11-11 NOTE — ED ATTESTATION NOTE
Procedures  Physical Exam  Review of Systems    11/10/97106:38 PM  I have personally seen and examined the patient.  I reviewed and agree with the PA/NP/Resident's assessment and plan of care.    My examination, assessment, and plan of care of Michael Lew is as follows:  The patient presents with chest pain left shoulder pain.  36-year-old male who comes in complaining of discomfort in his left upper chest and left shoulder intermittently for the past several days.  He is under quite a bit of stress.  Is not a smoker, no family history of cardiac disease.  Is concerned about possible cardiac etiology.  Exam: Well-developed male no obvious distress.  HEENT is unremarkable.  No respiratory distress.  Awake and alert.  Impression/Plan: Left shoulder and chest pain.  Rule out cardiac etiology.  Patient's work-up in the ER was unremarkable.  I think this is not of cardiac etiology.  The patient is under quite a bit of stress.  Could be musculoskeletal or gastrointestinal.    Vital Signs Review: Vital signs have been reviewed. The oxygen saturation is  SpO2: 99 % which is normal.    I was physically present for the key/critical portions of the following procedures: None    This document was created using dragon dictation software.  There might be some typographical errors due to this technology.     Valentín Blackman MD  11/10/21 7280

## 2021-11-23 ENCOUNTER — IMMUNIZATION (OUTPATIENT)
Dept: IMMUNIZATION | Facility: CLINIC | Age: 36
End: 2021-11-23
Payer: COMMERCIAL

## 2021-11-23 PROCEDURE — 91306 SARS-COV-2 VACCINE BOOSTER MODERNA: CPT | Performed by: FAMILY MEDICINE

## 2021-11-23 PROCEDURE — 0064A SARS-COV-2 VACCINE BOOSTER MODERNA: CPT | Performed by: FAMILY MEDICINE

## 2022-01-05 ENCOUNTER — OFFICE VISIT (OUTPATIENT)
Dept: FAMILY MEDICINE | Facility: CLINIC | Age: 37
End: 2022-01-05
Payer: COMMERCIAL

## 2022-01-05 VITALS
HEIGHT: 72 IN | OXYGEN SATURATION: 98 % | SYSTOLIC BLOOD PRESSURE: 144 MMHG | RESPIRATION RATE: 16 BRPM | HEART RATE: 116 BPM | DIASTOLIC BLOOD PRESSURE: 86 MMHG | TEMPERATURE: 97.8 F | BODY MASS INDEX: 42.66 KG/M2 | WEIGHT: 315 LBS

## 2022-01-05 DIAGNOSIS — F41.9 ANXIETY: ICD-10-CM

## 2022-01-05 DIAGNOSIS — Z00.00 WELLNESS EXAMINATION: Primary | ICD-10-CM

## 2022-01-05 DIAGNOSIS — R63.5 WEIGHT GAIN: ICD-10-CM

## 2022-01-05 DIAGNOSIS — G47.30 SLEEP APNEA, UNSPECIFIED TYPE: ICD-10-CM

## 2022-01-05 PROCEDURE — 3008F BODY MASS INDEX DOCD: CPT | Performed by: FAMILY MEDICINE

## 2022-01-05 PROCEDURE — 99395 PREV VISIT EST AGE 18-39: CPT | Performed by: FAMILY MEDICINE

## 2022-01-05 RX ORDER — TRIAMCINOLONE ACETONIDE 1 MG/G
1 OINTMENT TOPICAL 2 TIMES DAILY
Qty: 30 G | Refills: 1 | Status: SHIPPED | OUTPATIENT
Start: 2022-01-05 | End: 2023-01-11 | Stop reason: SDUPTHER

## 2022-01-05 ASSESSMENT — ENCOUNTER SYMPTOMS
HEADACHES: 0
ABDOMINAL PAIN: 0
PALPITATIONS: 0
FATIGUE: 0
SORE THROAT: 0
CONSTIPATION: 0
NERVOUS/ANXIOUS: 1
HEMATURIA: 0
NAUSEA: 0
BACK PAIN: 1
DIARRHEA: 0
BLOOD IN STOOL: 0
SHORTNESS OF BREATH: 0
FEVER: 0
ARTHRALGIAS: 0
SLEEP DISTURBANCE: 1
FREQUENCY: 0
CHILLS: 0
SINUS PRESSURE: 0
DIZZINESS: 0
COUGH: 0
VOMITING: 0

## 2022-01-05 NOTE — PATIENT INSTRUCTIONS
- Keep up the great work!  - Plan for exercise and weight loss is a great idea  - Try to get appointment with Sleep Medicine   - Try steroid cream for the eczema flare   - Check blood work in 2-3 months

## 2022-01-05 NOTE — PROGRESS NOTES
Subjective      Patient ID: Michael Lew is a 36 y.o. male.  1985      - Patient presenting for annual wellness exam  - Lab work - due for labs  - Immunizations - UTD  - Exercise - not much as of late  - Diet - not great during the holidays  - Lives with wife and son    - Has been working on mental health this year  - Focusing on anxiety reduction and awaareness and stress relief  - Bad panic attack in November - mom was in hospital at that time  - has talked to therapist - helpful  - [place] he had a bad reaction  - has clonazepam at home as needed -not taking    - Due for sleep study - scheduled for May with Dr. Duong    - Goal to lose weight and get back into exercise routine        The following have been reviewed and updated as appropriate in this visit:   Tobacco  Allergies  Meds  Problems  Med Hx  Surg Hx  Fam Hx        Past Medical History:   Diagnosis Date   • Anxiety    • Lumbar pain     chronic   • Panic attacks        History reviewed. No pertinent surgical history.    Social History     Socioeconomic History   • Marital status:      Spouse name: Not on file   • Number of children: Not on file   • Years of education: Not on file   • Highest education level: Not on file   Occupational History   • Not on file   Tobacco Use   • Smoking status: Never Smoker   • Smokeless tobacco: Never Used   Vaping Use   • Vaping Use: Never used   Substance and Sexual Activity   • Alcohol use: Yes   • Drug use: Yes     Types: Marijuana   • Sexual activity: Not on file   Other Topics Concern   • Not on file   Social History Narrative   • Not on file     Social Determinants of Health     Financial Resource Strain: Not on file   Food Insecurity: No Food Insecurity   • Worried About Running Out of Food in the Last Year: Never true   • Ran Out of Food in the Last Year: Never true   Transportation Needs: Not on file   Physical Activity: Not on file   Stress: Not on file   Social Connections: Not on file    Intimate Partner Violence: Not on file   Housing Stability: Not on file       Family History   Problem Relation Age of Onset   • Breast cancer Biological Mother    • Diabetes Biological Mother    • Hypertension Biological Mother    • Asthma Biological Father    • Diabetes Biological Father    • Sleep apnea Biological Father    • Sleep apnea Biological Brother        Patient has no known allergies.    Current Outpatient Medications   Medication Sig Dispense Refill   • albuterol HFA (VENTOLIN HFA) 90 mcg/actuation inhaler Inhale 2 puffs every 6 (six) hours as needed for wheezing. 1 Inhaler 1   • clonazePAM (KlonoPIN) 1 mg tablet Take 1 tablet (1 mg total) by mouth daily as needed for anxiety. 15 tablet 0   • methocarbamoL (ROBAXIN) 500 mg tablet TAKE 1 TABLET BY MOUTH EVERY 6 HOURS AS NEEDED FOR SEVERE SPASM FOR 7 DAYS     • multivit-min/ferrous fumarate (MULTI VITAMIN ORAL) Take by mouth.     • omeprazole 40 mg capsule Take 1 capsule (40 mg total) by mouth daily before breakfast. 30 capsule 2   • triamcinolone 0.1 % ointment Apply 1 application topically 2 (two) times a day. 30 g 1     No current facility-administered medications for this visit.       Review of Systems   Constitutional: Negative for chills, fatigue and fever.   HENT: Negative for congestion, sinus pressure and sore throat.    Eyes: Negative for visual disturbance.   Respiratory: Negative for cough and shortness of breath.    Cardiovascular: Negative for chest pain, palpitations and leg swelling.   Gastrointestinal: Negative for abdominal pain, blood in stool, constipation, diarrhea, nausea and vomiting.   Endocrine: Negative for polyuria.   Genitourinary: Negative for frequency and hematuria.   Musculoskeletal: Positive for back pain. Negative for arthralgias.   Skin: Negative for rash.   Neurological: Negative for dizziness and headaches.   Psychiatric/Behavioral: Positive for sleep disturbance. Negative for behavioral problems. The patient is  nervous/anxious.        Objective     Vitals:    01/05/22 1402   BP: (!) 144/96   BP Location: Left upper arm   Patient Position: Sitting   Pulse: (!) 116   Resp: 16   Temp: 36.6 °C (97.8 °F)   TempSrc: Temporal   SpO2: 98%   Weight: (!) 146 kg (321 lb)   Height: 1.829 m (6')     Body mass index is 43.54 kg/m².    Physical Exam  Vitals and nursing note reviewed.   Constitutional:       General: He is not in acute distress.     Appearance: Normal appearance. He is well-developed. He is not ill-appearing or toxic-appearing.   HENT:      Head: Normocephalic and atraumatic.      Right Ear: Tympanic membrane, ear canal and external ear normal.      Left Ear: Tympanic membrane, ear canal and external ear normal.   Eyes:      Conjunctiva/sclera: Conjunctivae normal.      Pupils: Pupils are equal, round, and reactive to light.   Neck:      Thyroid: No thyromegaly.   Cardiovascular:      Rate and Rhythm: Normal rate and regular rhythm.      Pulses: Normal pulses.      Heart sounds: Normal heart sounds, S1 normal and S2 normal. No murmur heard.  Pulmonary:      Effort: Pulmonary effort is normal.      Breath sounds: Normal breath sounds. No decreased breath sounds, wheezing, rhonchi or rales.   Abdominal:      General: Bowel sounds are normal.      Palpations: Abdomen is soft. There is no hepatomegaly or splenomegaly.      Tenderness: There is no abdominal tenderness. There is no guarding or rebound.   Musculoskeletal:         General: Normal range of motion.      Cervical back: Normal range of motion.   Skin:     General: Skin is warm.      Findings: No rash.   Neurological:      General: No focal deficit present.      Mental Status: He is alert and oriented to person, place, and time. He is not disoriented.      Cranial Nerves: No cranial nerve deficit.      Sensory: No sensory deficit.   Psychiatric:         Attention and Perception: Attention normal.         Mood and Affect: Mood normal. Mood is not anxious or depressed.          Behavior: Behavior normal. Behavior is cooperative.         Cognition and Memory: Cognition normal.         Assessment/Plan   Diagnoses and all orders for this visit:    Wellness examination (Primary)  -     CBC and Differential; Future  -     Lipid panel; Future  -     Comprehensive metabolic panel; Future    Anxiety  -     TSH w reflex FT4; Future    Sleep apnea, unspecified type  -     Ambulatory referral to Sleep Medicine; Future    Weight gain  -     TSH w reflex FT4; Future    Other orders  -     triamcinolone 0.1 % ointment; Apply 1 application topically 2 (two) times a day.    1. Wellness examination  - BP elevated  - Work on diet and exercise as discussed - weight loss will help BP  - Check labs in 2-3 months after changes made  - Immunizations UTD  - Follow up in 3-4 months  - CBC and Differential; Future  - Lipid panel; Future  - Comprehensive metabolic panel; Future  - CBC and Differential  - Lipid panel  - Comprehensive metabolic panel    2. Anxiety  - Continue with therapy  - Consider alternative med - SSRI  - KLONOPIN as needed  - TSH w reflex FT4; Future  - TSH w reflex FT4    3. Sleep apnea, unspecified type  - Referral to sleep team  - Call if trouble scheduling  - Ambulatory referral to Sleep Medicine; Future    4. Weight gain  - TSH w reflex FT4; Future  - TSH w reflex FT4

## 2022-01-25 ENCOUNTER — TELEPHONE (OUTPATIENT)
Dept: FAMILY MEDICINE | Facility: CLINIC | Age: 37
End: 2022-01-25
Payer: COMMERCIAL

## 2022-01-25 NOTE — TELEPHONE ENCOUNTER
Pts son is symptomatic tested positive 1/17.     Pt took rapid and tested positive.    Can you call back w/ guidelines please  CB: [phone]

## 2022-02-07 RX ORDER — OMEPRAZOLE 40 MG/1
CAPSULE, DELAYED RELEASE ORAL
Qty: 30 CAPSULE | Refills: 2 | Status: SHIPPED | OUTPATIENT
Start: 2022-02-07 | End: 2022-05-17

## 2022-03-25 ENCOUNTER — TELEMEDICINE (OUTPATIENT)
Dept: FAMILY MEDICINE | Facility: CLINIC | Age: 37
End: 2022-03-25
Payer: COMMERCIAL

## 2022-03-25 DIAGNOSIS — R03.0 ELEVATED BLOOD PRESSURE READING: Primary | ICD-10-CM

## 2022-03-25 PROCEDURE — 99213 OFFICE O/P EST LOW 20 MIN: CPT | Mod: 95 | Performed by: FAMILY MEDICINE

## 2022-03-25 ASSESSMENT — ENCOUNTER SYMPTOMS
DIZZINESS: 0
WEAKNESS: 0
CHEST TIGHTNESS: 0
FATIGUE: 0
DIFFICULTY URINATING: 0
COUGH: 0
HEADACHES: 0
ARTHRALGIAS: 0
PALPITATIONS: 0
ABDOMINAL PAIN: 0
NAUSEA: 0
UNEXPECTED WEIGHT CHANGE: 0
SHORTNESS OF BREATH: 0
FEVER: 0
NUMBNESS: 0

## 2022-03-25 NOTE — PROGRESS NOTES
Verification of Patient Location:  The patient affirms they are currently located in the following state: [place]    Request for Consent:    Audio and Video Encounter   Hello, my name is Piercelashanda Micthell DO.  Before we proceed, can you please verify your identification by telling me your full name and date of birth?  Can you tell me who is in the room with you?    You and I are about to have a telemedicine check-in or visit because you have requested it.  This is a live video-conference.  I am a real person, speaking to you in real time.  There is no one else with me on the video-conference.  However, when we use ([employer], [employer], etc) it is important for you to know that the video-conference may not be secure or private.  I am not recording this conversation and I am asking you not to record it.  This telemedicine visit will be billed to your health insurance or you, if you are self-insured.  You understand you will be responsible for any copayments or coinsurances that apply to your telemedicine visit.  Communication platform used for this encounter:  [employer]     Before starting our telemedicine visit, I am required to get your consent for this virtual check-in or visit by telemedicine. Do you consent?      Patient Response to Request for Consent:  Yes      Visit Documentation:  Subjective     Patient ID: Michael Lew is a 36 y.o. male.  1985      - Patient presenting for telmedicine visit to discuss BP  - He went to [facility] minute clinic to be tested for Covid because he is traveling  - BP was 140/90 and the CRNP recommended he follow up with me about this  - Overall he has been doing very well  - He has made great lifestyle changes  -He is down about 35 pounds  -He has cut back on alcohol intake out  -Cooking  food at home with his wife and son  -Exercising daily.  Doing treadmill exercises and strength exercises  -Denies chest pain, shortness of breath, headaches, visual changes,  dizziness  -Overall back and GI symptoms are better to since losing the weight      The following have been reviewed and updated as appropriate in this visit:   Tobacco  Allergies  Meds  Problems  Med Hx  Surg Hx  Fam Hx          Past Medical History:   Diagnosis Date   • Anxiety    • Lumbar pain     chronic   • Panic attacks        History reviewed. No pertinent surgical history.    Social History     Socioeconomic History   • Marital status:      Spouse name: Not on file   • Number of children: Not on file   • Years of education: Not on file   • Highest education level: Not on file   Occupational History   • Not on file   Tobacco Use   • Smoking status: Never Smoker   • Smokeless tobacco: Never Used   Vaping Use   • Vaping Use: Never used   Substance and Sexual Activity   • Alcohol use: Yes   • Drug use: Yes     Types: Marijuana   • Sexual activity: Not on file   Other Topics Concern   • Not on file   Social History Narrative   • Not on file     Social Determinants of Health     Financial Resource Strain: Not on file   Food Insecurity: No Food Insecurity   • Worried About Running Out of Food in the Last Year: Never true   • Ran Out of Food in the Last Year: Never true   Transportation Needs: Not on file   Physical Activity: Not on file   Stress: Not on file   Social Connections: Not on file   Intimate Partner Violence: Not on file   Housing Stability: Not on file       Family History   Problem Relation Age of Onset   • Breast cancer Biological Mother    • Diabetes Biological Mother    • Hypertension Biological Mother    • Asthma Biological Father    • Diabetes Biological Father    • Sleep apnea Biological Father    • Sleep apnea Biological Brother        Patient has no known allergies.    Current Outpatient Medications   Medication Sig Dispense Refill   • albuterol HFA (VENTOLIN HFA) 90 mcg/actuation inhaler Inhale 2 puffs every 6 (six) hours as needed for wheezing. 1 Inhaler 1   • clonazePAM  (KlonoPIN) 1 mg tablet Take 1 tablet (1 mg total) by mouth daily as needed for anxiety. 15 tablet 0   • methocarbamoL (ROBAXIN) 500 mg tablet TAKE 1 TABLET BY MOUTH EVERY 6 HOURS AS NEEDED FOR SEVERE SPASM FOR 7 DAYS     • multivit-min/ferrous fumarate (MULTI VITAMIN ORAL) Take by mouth.     • omeprazole (PriLOSEC) 40 mg capsule take 1 capsule by mouth once daily before breakfast 30 capsule 2   • triamcinolone 0.1 % ointment Apply 1 application topically 2 (two) times a day. 30 g 1     No current facility-administered medications for this visit.       Review of Systems   Constitutional: Negative for fatigue, fever and unexpected weight change.   Eyes: Negative for visual disturbance.   Respiratory: Negative for cough, chest tightness and shortness of breath.    Cardiovascular: Negative for chest pain, palpitations and leg swelling.   Gastrointestinal: Negative for abdominal pain and nausea.   Genitourinary: Negative for difficulty urinating.   Musculoskeletal: Negative for arthralgias.   Neurological: Negative for dizziness, weakness, numbness and headaches.         Assessment/Plan   Diagnoses and all orders for this visit:    Elevated blood pressure reading (Primary)    1. Elevated blood pressure reading  - Continue with healthy lifestyle changes  - Keep up the great work!  - Check BP at home  - Continue healthy exercise  - Decrease caffeine  - Continue limiting alcohol  - If BP at home consistently >140/90, will start low dose med  - Patient to notify me in 3-4 weeks with readings.      Time Spent:  I spent 15 minutes on this date of service performing the following activities: documenting, providing counseling and education and independently reviewing study/studies.

## 2022-05-17 RX ORDER — OMEPRAZOLE 40 MG/1
CAPSULE, DELAYED RELEASE ORAL
Qty: 30 CAPSULE | Refills: 2 | Status: SHIPPED | OUTPATIENT
Start: 2022-05-17 | End: 2022-11-23 | Stop reason: ALTCHOICE

## 2022-11-23 ENCOUNTER — OFFICE VISIT (OUTPATIENT)
Dept: FAMILY MEDICINE | Facility: CLINIC | Age: 37
End: 2022-11-23
Payer: COMMERCIAL

## 2022-11-23 VITALS
DIASTOLIC BLOOD PRESSURE: 100 MMHG | SYSTOLIC BLOOD PRESSURE: 150 MMHG | HEART RATE: 99 BPM | BODY MASS INDEX: 43.13 KG/M2 | OXYGEN SATURATION: 99 % | WEIGHT: 315 LBS | TEMPERATURE: 97.3 F

## 2022-11-23 DIAGNOSIS — F41.9 ANXIETY: ICD-10-CM

## 2022-11-23 DIAGNOSIS — R03.0 ELEVATED BLOOD PRESSURE READING: Primary | ICD-10-CM

## 2022-11-23 PROCEDURE — 99214 OFFICE O/P EST MOD 30 MIN: CPT | Performed by: FAMILY MEDICINE

## 2022-11-23 PROCEDURE — 3008F BODY MASS INDEX DOCD: CPT | Performed by: FAMILY MEDICINE

## 2022-11-23 RX ORDER — ESCITALOPRAM OXALATE 10 MG/1
10 TABLET ORAL DAILY
Qty: 30 TABLET | Refills: 3 | Status: SHIPPED | OUTPATIENT
Start: 2022-11-23 | End: 2023-01-11 | Stop reason: SDUPTHER

## 2022-11-23 RX ORDER — CLONAZEPAM 1 MG/1
1 TABLET ORAL DAILY PRN
Qty: 15 TABLET | Refills: 0 | Status: SHIPPED | OUTPATIENT
Start: 2022-11-23 | End: 2023-01-11

## 2022-11-23 RX ORDER — AMLODIPINE BESYLATE 5 MG/1
5 TABLET ORAL DAILY
Qty: 90 TABLET | Refills: 1 | Status: SHIPPED | OUTPATIENT
Start: 2022-11-23 | End: 2023-01-11 | Stop reason: SDUPTHER

## 2022-11-23 ASSESSMENT — ENCOUNTER SYMPTOMS
NAUSEA: 0
LIGHT-HEADEDNESS: 0
VOMITING: 0
CONSTIPATION: 0
PALPITATIONS: 0
DIFFICULTY URINATING: 0
SHORTNESS OF BREATH: 0
CHEST TIGHTNESS: 0
HEADACHES: 1
COUGH: 0
DIARRHEA: 0
MYALGIAS: 0
FATIGUE: 0
ABDOMINAL PAIN: 0
WHEEZING: 0
DIZZINESS: 0
ARTHRALGIAS: 0
UNEXPECTED WEIGHT CHANGE: 0

## 2022-11-23 NOTE — PROGRESS NOTES
Subjective      Patient ID: Tristen Lew is a 37 y.o. male.  1985      - Patient presenting for follow up of general conditions    - Anxiety/Stress - Higher than normal.  Lots of stress.  Lots of travel with work.  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  Was on DULOXETINE in the past - bad side effects.  KLONOPIN as needed has worked for panic attacks.  Doing therapy through work still which is helpful.  Open to other options for medications.    - High BP - Was high at last visit in office.  He had lost significant weight intentionally and BP was improving.  With work and stress this summer, weight has come back up.  Open to medication at this point for BP.    - Outlets right now - walking on treadmill in the AM  - Rowing machine at the house  - Will have some less travel coming up with baby on the way, and hoping to make some changes      The following have been reviewed and updated as appropriate in this visit:   Tobacco  Allergies  Meds  Problems  Med Hx  Surg Hx  Fam Hx        Past Medical History:   Diagnosis Date    Anxiety     Lumbar pain     chronic    Panic attacks        History reviewed. No pertinent surgical history.    Social History     Socioeconomic History    Marital status:      Spouse name: Not on file    Number of children: Not on file    Years of education: Not on file    Highest education level: Not on file   Occupational History    Not on file   Tobacco Use    Smoking status: Never    Smokeless tobacco: Never   Vaping Use    Vaping Use: Never used   Substance and Sexual Activity    Alcohol use: Yes    Drug use: Yes     Types: Marijuana    Sexual activity: Not on file   Other Topics Concern    Not on file   Social History Narrative    Not on file     Social Determinants of Health     Financial Resource Strain: Not on file   Food Insecurity: Not on file   Transportation Needs: Not on file   Physical Activity: Not on file   Stress: Not on  file   Social Connections: Not on file   Intimate Partner Violence: Not on file   Housing Stability: Not on file       Family History   Problem Relation Age of Onset    Breast cancer Biological Mother     Diabetes Biological Mother     Hypertension Biological Mother     Asthma Biological Father     Diabetes Biological Father     Sleep apnea Biological Father     Hypertension Biological Brother     Sleep apnea Biological Brother        Patient has no known allergies.    Current Outpatient Medications   Medication Sig Dispense Refill    amLODIPine (NORVASC) 5 mg tablet Take 1 tablet (5 mg total) by mouth daily. 90 tablet 1    clonazePAM (KlonoPIN) 1 mg tablet Take 1 tablet (1 mg total) by mouth daily as needed for anxiety. 15 tablet 0    escitalopram (LEXAPRO) 10 mg tablet Take 1 tablet (10 mg total) by mouth daily. 30 tablet 3    multivit-min/ferrous fumarate (MULTI VITAMIN ORAL) Take by mouth.      albuterol HFA (VENTOLIN HFA) 90 mcg/actuation inhaler Inhale 2 puffs every 6 (six) hours as needed for wheezing. 1 Inhaler 1    triamcinolone 0.1 % ointment Apply 1 application topically 2 (two) times a day. 30 g 1     No current facility-administered medications for this visit.       Review of Systems   Constitutional: Negative for fatigue and unexpected weight change.   Eyes: Negative for visual disturbance.   Respiratory: Negative for cough, chest tightness, shortness of breath and wheezing.    Cardiovascular: Negative for chest pain, palpitations and leg swelling.   Gastrointestinal: Negative for abdominal pain, constipation, diarrhea, nausea and vomiting.   Genitourinary: Negative for difficulty urinating.   Musculoskeletal: Negative for arthralgias and myalgias.   Skin: Negative for rash.   Neurological: Positive for headaches. Negative for dizziness and light-headedness.       Objective     Vitals:    11/23/22 1025   BP: (!) 150/100   Pulse: 99   Temp: 36.3 °C (97.3 °F)   SpO2: 99%   Weight: (!) 144  kg (318 lb)     Body mass index is 43.13 kg/m².    Physical Exam  Vitals and nursing note reviewed.   Constitutional:       General: He is not in acute distress.     Appearance: Normal appearance. He is not ill-appearing, toxic-appearing or diaphoretic.   Cardiovascular:      Rate and Rhythm: Normal rate and regular rhythm.      Pulses: Normal pulses.      Heart sounds: Normal heart sounds.   Pulmonary:      Effort: Pulmonary effort is normal.      Breath sounds: Normal breath sounds.   Neurological:      General: No focal deficit present.      Mental Status: He is alert and oriented to person, place, and time.   Psychiatric:         Mood and Affect: Mood normal.         Behavior: Behavior normal.         Thought Content: Thought content normal.         Judgment: Judgment normal.         Assessment/Plan   Diagnoses and all orders for this visit:    Elevated blood pressure reading (Primary)    Anxiety    Other orders  -     amLODIPine (NORVASC) 5 mg tablet; Take 1 tablet (5 mg total) by mouth daily.  -     clonazePAM (KlonoPIN) 1 mg tablet; Take 1 tablet (1 mg total) by mouth daily as needed for anxiety.  -     escitalopram (LEXAPRO) 10 mg tablet; Take 1 tablet (10 mg total) by mouth daily.    1. Elevated blood pressure reading  - Start AMLODIPINE once daily  - Check BP at home  - Continue working on lifestyle modifications to help with weight loss which will help with BP reduction  - Follow up in 6-8 weeks  - Do blood work before then    2. Anxiety  - [place] decision made to start LEXAPRO  - Can take KLONOPIN as needed  - Continue with therapy  - Plan as above  - Follow up as above

## 2023-01-11 ENCOUNTER — OFFICE VISIT (OUTPATIENT)
Dept: FAMILY MEDICINE | Facility: CLINIC | Age: 38
End: 2023-01-11
Payer: COMMERCIAL

## 2023-01-11 VITALS
HEART RATE: 98 BPM | SYSTOLIC BLOOD PRESSURE: 144 MMHG | RESPIRATION RATE: 15 BRPM | WEIGHT: 315 LBS | BODY MASS INDEX: 42.66 KG/M2 | HEIGHT: 72 IN | OXYGEN SATURATION: 98 % | TEMPERATURE: 97.6 F | DIASTOLIC BLOOD PRESSURE: 82 MMHG

## 2023-01-11 DIAGNOSIS — F41.9 ANXIETY: Primary | ICD-10-CM

## 2023-01-11 DIAGNOSIS — I10 PRIMARY HYPERTENSION: ICD-10-CM

## 2023-01-11 PROCEDURE — 99214 OFFICE O/P EST MOD 30 MIN: CPT | Performed by: FAMILY MEDICINE

## 2023-01-11 PROCEDURE — 3008F BODY MASS INDEX DOCD: CPT | Performed by: FAMILY MEDICINE

## 2023-01-11 PROCEDURE — 3079F DIAST BP 80-89 MM HG: CPT | Performed by: FAMILY MEDICINE

## 2023-01-11 PROCEDURE — 3077F SYST BP >= 140 MM HG: CPT | Performed by: FAMILY MEDICINE

## 2023-01-11 RX ORDER — AMLODIPINE BESYLATE 5 MG/1
5 TABLET ORAL DAILY
Qty: 90 TABLET | Refills: 1 | Status: SHIPPED | OUTPATIENT
Start: 2023-01-11 | End: 2023-06-22 | Stop reason: SDUPTHER

## 2023-01-11 RX ORDER — ESCITALOPRAM OXALATE 10 MG/1
10 TABLET ORAL DAILY
Qty: 90 TABLET | Refills: 1 | Status: SHIPPED | OUTPATIENT
Start: 2023-01-11 | End: 2023-06-22 | Stop reason: SDUPTHER

## 2023-01-11 RX ORDER — TRIAMCINOLONE ACETONIDE 1 MG/G
1 OINTMENT TOPICAL 2 TIMES DAILY
Qty: 30 G | Refills: 1 | Status: SHIPPED | OUTPATIENT
Start: 2023-01-11 | End: 2023-02-10

## 2023-01-11 ASSESSMENT — ENCOUNTER SYMPTOMS
HEADACHES: 0
PALPITATIONS: 0
DIZZINESS: 0
FATIGUE: 0
DYSPHORIC MOOD: 0
COUGH: 0
SLEEP DISTURBANCE: 0
DIAPHORESIS: 0
UNEXPECTED WEIGHT CHANGE: 0
ABDOMINAL PAIN: 0
NERVOUS/ANXIOUS: 1

## 2023-01-11 NOTE — PROGRESS NOTES
Subjective      Patient ID: Tristen Lew is a 37 y.o. male.  1985      - Patient presenting for follow up of chronic conditions    - HTN - Overall improving.  He is taking AMLODIPINE 5 mg daily and tolerating well.  Feels that his BP is lower.  Tolerating medication well/.    - Anxiety - Much improved.  States he feels in a much better place.  Taking LEXAPRO 10 mg daily and tolerating well.  Less panic attacks overall.  Exercising more.  Has taken 2 Klonopin in the past 3 months.      The following have been reviewed and updated as appropriate in this visit:   Tobacco  Allergies  Meds  Problems  Med Hx  Surg Hx  Fam Hx        Past Medical History:   Diagnosis Date   • Anxiety    • Lumbar pain     chronic   • Panic attacks        History reviewed. No pertinent surgical history.    Social History     Socioeconomic History   • Marital status:      Spouse name: Not on file   • Number of children: Not on file   • Years of education: Not on file   • Highest education level: Not on file   Occupational History   • Not on file   Tobacco Use   • Smoking status: Never   • Smokeless tobacco: Never   Vaping Use   • Vaping Use: Never used   Substance and Sexual Activity   • Alcohol use: Yes   • Drug use: Yes     Types: Marijuana   • Sexual activity: Not on file   Other Topics Concern   • Not on file   Social History Narrative   • Not on file     Social Determinants of Health     Financial Resource Strain: Not on file   Food Insecurity: Not on file   Transportation Needs: Not on file   Physical Activity: Not on file   Stress: Not on file   Social Connections: Not on file   Intimate Partner Violence: Not on file   Housing Stability: Not on file       Family History   Problem Relation Age of Onset   • Breast cancer Biological Mother    • Diabetes Biological Mother    • Hypertension Biological Mother    • Asthma Biological Father    • Diabetes Biological Father    • Sleep apnea Biological Father    •  Hypertension Biological Brother    • Sleep apnea Biological Brother        Patient has no known allergies.    Current Outpatient Medications   Medication Sig Dispense Refill   • amLODIPine (NORVASC) 5 mg tablet Take 1 tablet (5 mg total) by mouth daily. 90 tablet 1   • clonazePAM (KlonoPIN) 1 mg tablet Take 1 tablet (1 mg total) by mouth daily as needed for anxiety. 15 tablet 0   • escitalopram (LEXAPRO) 10 mg tablet Take 1 tablet (10 mg total) by mouth daily. 90 tablet 1   • multivit-min/ferrous fumarate (MULTI VITAMIN ORAL) Take by mouth.     • triamcinolone (KENALOG) 0.1 % ointment Apply 1 application. topically 2 (two) times a day. 30 g 1   • albuterol HFA (VENTOLIN HFA) 90 mcg/actuation inhaler Inhale 2 puffs every 6 (six) hours as needed for wheezing. 1 Inhaler 1     No current facility-administered medications for this visit.       Review of Systems   Constitutional: Negative for diaphoresis, fatigue and unexpected weight change.   Eyes: Negative for visual disturbance.   Respiratory: Negative for cough.    Cardiovascular: Negative for chest pain and palpitations.   Gastrointestinal: Negative for abdominal pain.   Neurological: Negative for dizziness and headaches.   Psychiatric/Behavioral: Negative for dysphoric mood and sleep disturbance. The patient is nervous/anxious.        Objective     Vitals:    01/11/23 0940   BP: (!) 144/82   BP Location: Right upper arm   Patient Position: Sitting   Pulse: 98   Resp: 15   Temp: 36.4 °C (97.6 °F)   TempSrc: Temporal   SpO2: 98%   Weight: (!) 146 kg (321 lb)   Height: 1.829 m (6')     Body mass index is 43.54 kg/m².    Physical Exam  Vitals and nursing note reviewed.   Constitutional:       General: He is not in acute distress.     Appearance: Normal appearance. He is not ill-appearing, toxic-appearing or diaphoretic.   Neurological:      General: No focal deficit present.      Mental Status: He is alert and oriented to person, place, and time.   Psychiatric:          Mood and Affect: Mood normal.         Behavior: Behavior normal.         Thought Content: Thought content normal.         Judgment: Judgment normal.         Assessment/Plan   Diagnoses and all orders for this visit:    Anxiety (Primary)    Primary hypertension    Other orders  -     escitalopram (LEXAPRO) 10 mg tablet; Take 1 tablet (10 mg total) by mouth daily.  -     amLODIPine (NORVASC) 5 mg tablet; Take 1 tablet (5 mg total) by mouth daily.  -     triamcinolone (KENALOG) 0.1 % ointment; Apply 1 application. topically 2 (two) times a day.    1. Anxiety  - Much improved!  - Continue LEXAPRO  - Continue KLONOPIN as needed  - Continue exercise routine  - Follow up in 6 months or sooner as needed    2. Primary hypertension  - Improved!  - Anticipate further improvement with ongoing lifestyle changes  - Continue AMLODIPINE  - Continue exercise routine  - Healthy dietary routine  - Continue low alcohol intake  - Follow up in 6 months

## 2023-01-23 ENCOUNTER — TELEMEDICINE (OUTPATIENT)
Dept: FAMILY MEDICINE | Facility: CLINIC | Age: 38
End: 2023-01-23
Payer: COMMERCIAL

## 2023-01-23 DIAGNOSIS — H10.9 BACTERIAL CONJUNCTIVITIS OF LEFT EYE: Primary | ICD-10-CM

## 2023-01-23 PROCEDURE — 99213 OFFICE O/P EST LOW 20 MIN: CPT | Mod: 95 | Performed by: STUDENT IN AN ORGANIZED HEALTH CARE EDUCATION/TRAINING PROGRAM

## 2023-01-23 RX ORDER — POLYMYXIN B SULFATE AND TRIMETHOPRIM 1; 10000 MG/ML; [USP'U]/ML
1 SOLUTION OPHTHALMIC
Qty: 10 ML | Refills: 0 | Status: SHIPPED | OUTPATIENT
Start: 2023-01-23 | End: 2023-01-25 | Stop reason: ALTCHOICE

## 2023-01-23 NOTE — PROGRESS NOTES
Verification of Patient Location:  The patient affirms they are currently located in the following state: [place]    Request for Consent:    Audio and Video Encounter   David, my name is Ashley Clarke MD.  Before we proceed, can you please verify your identification by telling me your full name and date of birth?  Can you tell me who is in the room with you?    You and I are about to have a telemedicine check-in or visit because you have requested it.  This is a live video-conference.  I am a real person, speaking to you in real time.  There is no one else with me on the video-conference. I am not recording this conversation and I am asking you not to record it.  This telemedicine visit will be billed to your health insurance or you, if you are self-insured.  You understand you will be responsible for any copayments or coinsurances that apply to your telemedicine visit.  Communication platform used for this encounter:  [email or web address] Video Visit (Epic Video Client)       Before starting our telemedicine visit, I am required to get your consent for this virtual check-in or visit by telemedicine. Do you consent?      Patient Response to Request for Consent:  Yes      Visit Documentation:  Subjective     Patient ID: Tristen Lew is a 37 y.o. male.  1985      HPI     Patient presents for reddened conjunctiva of the left eye.  His three year old son was diagnosed with bacterial conjunctivitis.  Eye is watery.  There is no purulent discharge.  Itching is present.  Patient has no recent viral respiratory symptoms.  Has not tried anything OTC.    The following have been reviewed and updated as appropriate in this visit:   Allergies  Meds  Problems       Review of Systems reviewed and as noted in the HPI.      Assessment/Plan   Diagnoses and all orders for this visit:    Bacterial conjunctivitis of left eye (Primary)  With itching and no purulent discharge  Start artificial tears or pataday  Polytrim if needed given  close contact with bacterial conjunctivitis    Other orders  -     polymyxin B sulf-trimethoprim (POLYTRIM) 10,000 unit- 1 mg/mL ophthalmic solution; Administer 1 drop into the left eye every 4 (four) hours for 7 days.      Time Spent:  I spent 13 minutes on this date of service performing the following activities: obtaining history, entering orders and documenting.

## 2023-01-25 ENCOUNTER — TELEPHONE (OUTPATIENT)
Dept: FAMILY MEDICINE | Facility: CLINIC | Age: 38
End: 2023-01-25
Payer: COMMERCIAL

## 2023-01-25 RX ORDER — OFLOXACIN 3 MG/ML
2 SOLUTION/ DROPS OPHTHALMIC
Qty: 5 ML | Refills: 0 | Status: SHIPPED | OUTPATIENT
Start: 2023-01-25 | End: 2023-02-01

## 2023-01-25 NOTE — TELEPHONE ENCOUNTER
Since calling earlier, his eyes have gotten progressively worse. The pink eye has spread to both eyes.  Pt used hydrating drops and it had no effect  Antibiotic eye drops he was perscribed seems to make it worse.     Pt is with his wife in the hospital; she just had their daughter yesterday.   Pt would like to get a handle on this pink eye so that he can help his wife with their new baby.

## 2023-01-25 NOTE — TELEPHONE ENCOUNTER
Can switch to Ofloxacin eye drops.  Sent today.  Possible that the conjunctivitis is more viral or allergic than bacterial, but can cover with the antibiotic drops.  If itchy can use cool compresses.  If a lot of discharge, can try warm compresses.  Follow up in office if not improving

## 2023-01-25 NOTE — TELEPHONE ENCOUNTER
Called to notify pt.  Since stopping the other drops and using hydrating drops, pt reports that the discharge has slowed down and the itching has calmed down as well.     Pt feels as though he is making progress just using the hydrating drops and will see how it goes before using the new drops; if nothing improves by tomorrow he will  the new prescription and try those.

## 2023-01-25 NOTE — TELEPHONE ENCOUNTER
Pt has telemed appt earlier this week for pink eye the rx is making it worse please let him know. Thanks

## 2023-06-22 RX ORDER — AMLODIPINE BESYLATE 5 MG/1
5 TABLET ORAL DAILY
Qty: 90 TABLET | Refills: 0 | Status: SHIPPED | OUTPATIENT
Start: 2023-06-22 | End: 2023-10-18

## 2023-06-22 RX ORDER — ESCITALOPRAM OXALATE 10 MG/1
10 TABLET ORAL DAILY
Qty: 90 TABLET | Refills: 0 | Status: SHIPPED | OUTPATIENT
Start: 2023-06-22 | End: 2023-07-13

## 2023-07-13 RX ORDER — ESCITALOPRAM OXALATE 10 MG/1
10 TABLET ORAL DAILY
Qty: 90 TABLET | Refills: 0 | Status: SHIPPED | OUTPATIENT
Start: 2023-07-13 | End: 2023-10-18

## 2023-10-18 RX ORDER — AMLODIPINE BESYLATE 5 MG/1
5 TABLET ORAL DAILY
Qty: 90 TABLET | Refills: 3 | Status: SHIPPED | OUTPATIENT
Start: 2023-10-18 | End: 2024-09-11

## 2023-10-18 RX ORDER — ESCITALOPRAM OXALATE 10 MG/1
10 TABLET ORAL DAILY
Qty: 90 TABLET | Refills: 3 | Status: SHIPPED | OUTPATIENT
Start: 2023-10-18 | End: 2024-09-11

## 2023-11-02 ENCOUNTER — OFFICE VISIT (OUTPATIENT)
Dept: FAMILY MEDICINE | Facility: CLINIC | Age: 38
End: 2023-11-02
Payer: COMMERCIAL

## 2023-11-02 VITALS
SYSTOLIC BLOOD PRESSURE: 120 MMHG | DIASTOLIC BLOOD PRESSURE: 82 MMHG | HEIGHT: 72 IN | WEIGHT: 315 LBS | OXYGEN SATURATION: 99 % | HEART RATE: 84 BPM | TEMPERATURE: 97.3 F | BODY MASS INDEX: 42.66 KG/M2

## 2023-11-02 DIAGNOSIS — M54.42 CHRONIC BILATERAL LOW BACK PAIN WITH LEFT-SIDED SCIATICA: ICD-10-CM

## 2023-11-02 DIAGNOSIS — G89.29 CHRONIC BILATERAL LOW BACK PAIN WITH LEFT-SIDED SCIATICA: ICD-10-CM

## 2023-11-02 DIAGNOSIS — E66.01 CLASS 3 SEVERE OBESITY WITH SERIOUS COMORBIDITY AND BODY MASS INDEX (BMI) OF 45.0 TO 49.9 IN ADULT, UNSPECIFIED OBESITY TYPE (CMS/HCC): ICD-10-CM

## 2023-11-02 DIAGNOSIS — Z00.00 WELLNESS EXAMINATION: Primary | ICD-10-CM

## 2023-11-02 DIAGNOSIS — I10 PRIMARY HYPERTENSION: ICD-10-CM

## 2023-11-02 DIAGNOSIS — E66.813 CLASS 3 SEVERE OBESITY WITH SERIOUS COMORBIDITY AND BODY MASS INDEX (BMI) OF 45.0 TO 49.9 IN ADULT, UNSPECIFIED OBESITY TYPE (CMS/HCC): ICD-10-CM

## 2023-11-02 PROCEDURE — 99395 PREV VISIT EST AGE 18-39: CPT | Performed by: FAMILY MEDICINE

## 2023-11-02 PROCEDURE — 3008F BODY MASS INDEX DOCD: CPT | Performed by: FAMILY MEDICINE

## 2023-11-02 PROCEDURE — 3074F SYST BP LT 130 MM HG: CPT | Performed by: FAMILY MEDICINE

## 2023-11-02 PROCEDURE — 3079F DIAST BP 80-89 MM HG: CPT | Performed by: FAMILY MEDICINE

## 2023-11-02 ASSESSMENT — ENCOUNTER SYMPTOMS
SLEEP DISTURBANCE: 0
COUGH: 0
SORE THROAT: 0
DIZZINESS: 0
CONSTIPATION: 0
ABDOMINAL PAIN: 0
HEMATURIA: 0
FREQUENCY: 0
DIARRHEA: 0
PALPITATIONS: 0
HEADACHES: 0
NAUSEA: 0
BACK PAIN: 0
BLOOD IN STOOL: 0
FEVER: 0
CHILLS: 0
SINUS PRESSURE: 0
SHORTNESS OF BREATH: 0
ARTHRALGIAS: 0
VOMITING: 0
FATIGUE: 0

## 2023-11-02 ASSESSMENT — PATIENT HEALTH QUESTIONNAIRE - PHQ9: SUM OF ALL RESPONSES TO PHQ9 QUESTIONS 1 & 2: 0

## 2023-11-02 NOTE — PROGRESS NOTES
Subjective      Patient ID: Tristen Lew is a 38 y.o. male.  1985      - Patient presenting for annual wellness exam  - Lab work - due for labs  - Immunizations - UTD  - Exercise - limited because of back  - Diet - looking to make changes  - Lives with wife and two kids      The following have been reviewed and updated as appropriate in this visit:   Tobacco  Allergies  Meds  Problems  Med Hx  Surg Hx  Fam Hx        Past Medical History:   Diagnosis Date    Anxiety     Lumbar pain     chronic    Panic attacks        History reviewed. No pertinent surgical history.    Social History     Socioeconomic History    Marital status:      Spouse name: Not on file    Number of children: Not on file    Years of education: Not on file    Highest education level: Not on file   Occupational History    Not on file   Tobacco Use    Smoking status: Never    Smokeless tobacco: Never   Vaping Use    Vaping Use: Never used   Substance and Sexual Activity    Alcohol use: Yes    Drug use: Yes     Types: Marijuana    Sexual activity: Not on file   Other Topics Concern    Not on file   Social History Narrative    Not on file     Social Determinants of Health     Financial Resource Strain: Not on file   Food Insecurity: No Food Insecurity (11/10/2021)    Hunger Vital Sign     Worried About Running Out of Food in the Last Year: Never true     Ran Out of Food in the Last Year: Never true   Transportation Needs: Not on file   Physical Activity: Not on file   Stress: Not on file   Social Connections: Not on file   Intimate Partner Violence: Not on file   Housing Stability: Not on file       Family History   Problem Relation Age of Onset    Breast cancer Biological Mother     Diabetes Biological Mother     Hypertension Biological Mother     Asthma Biological Father     Diabetes Biological Father     Sleep apnea Biological Father     Hypertension Biological Brother     Sleep apnea Biological Brother   "      Patient has no known allergies.    Current Outpatient Medications   Medication Sig Dispense Refill    amLODIPine (NORVASC) 5 mg tablet TAKE 1 TABLET DAILY 90 tablet 3    escitalopram (LEXAPRO) 10 mg tablet TAKE 1 TABLET DAILY 90 tablet 3    multivit-min/ferrous fumarate (MULTI VITAMIN ORAL) Take by mouth.      semaglutide, weight loss, (WEGOVY) 0.25 mg/0.5 mL subcutaneous injection Inject 0.25 mg under the skin every (seven) 7 days. 0.5 mL 3    albuterol HFA (VENTOLIN HFA) 90 mcg/actuation inhaler Inhale 2 puffs every 6 (six) hours as needed for wheezing. 1 Inhaler 1    clonazePAM (KlonoPIN) 1 mg tablet Take 1 tablet (1 mg total) by mouth daily as needed for anxiety. 15 tablet 0    triamcinolone (KENALOG) 0.1 % ointment Apply 1 application. topically 2 (two) times a day. 30 g 1     No current facility-administered medications for this visit.       Review of Systems   Constitutional: Negative for chills, fatigue and fever.   HENT: Negative for congestion, sinus pressure and sore throat.    Eyes: Negative for visual disturbance.   Respiratory: Negative for cough and shortness of breath.    Cardiovascular: Negative for chest pain, palpitations and leg swelling.   Gastrointestinal: Negative for abdominal pain, blood in stool, constipation, diarrhea, nausea and vomiting.   Endocrine: Negative for polyuria.   Genitourinary: Negative for frequency and hematuria.   Musculoskeletal: Negative for arthralgias and back pain.   Skin: Negative for rash.   Neurological: Negative for dizziness and headaches.   Psychiatric/Behavioral: Negative for behavioral problems and sleep disturbance.       Objective     Vitals:    11/02/23 1107   BP: 120/82   Pulse: 84   Temp: 36.3 °C (97.3 °F)   SpO2: 99%   Weight: (!) 149 kg (329 lb)   Height: 1.816 m (5' 11.5\")     Body mass index is 45.25 kg/m².    Physical Exam  Vitals and nursing note reviewed.   Constitutional:       General: He is not in acute distress.     Appearance: " Normal appearance. He is not ill-appearing, toxic-appearing or diaphoretic.   Cardiovascular:      Rate and Rhythm: Normal rate and regular rhythm.   Neurological:      General: No focal deficit present.      Mental Status: He is alert and oriented to person, place, and time.   Psychiatric:         Mood and Affect: Mood normal.         Behavior: Behavior normal.         Thought Content: Thought content normal.         Judgment: Judgment normal.         Assessment/Plan   Diagnoses and all orders for this visit:    Wellness examination (Primary)  -     CBC and Differential; Future  -     Comprehensive metabolic panel; Future  -     Lipid panel; Future    Chronic bilateral low back pain with left-sided sciatica    Class 3 severe obesity with serious comorbidity and body mass index (BMI) of 45.0 to 49.9 in adult, unspecified obesity type (CMS/HCC)  -     TSH w reflex FT4; Future    Other orders  -     semaglutide, weight loss, (WEGOVY) 0.25 mg/0.5 mL subcutaneous injection; Inject 0.25 mg under the skin every (seven) 7 days.    1. Wellness examination  - Improved BP today  - Check labs  - Immunizations UTD  - Plan as below  - Follow up with me in 3 months from starting WEGOVY  - CBC and Differential; Future  - Comprehensive metabolic panel; Future  - Lipid panel; Future  - CBC and Differential  - Comprehensive metabolic panel  - Lipid panel    2. Chronic bilateral low back pain with left-sided sciatica  - See Dr. Oviedo for follow up imaging and evaluation    3. Class 3 severe obesity with serious comorbidity and body mass index (BMI) of 45.0 to 49.9 in adult, unspecified obesity type (CMS/HCC)  - Plan to start WEGOVY   - Sent to pharmacy  - Patient to notify me regarding coverage  - TSH w reflex FT4; Future  - TSH w reflex FT4

## 2023-12-12 DIAGNOSIS — E66.813 CLASS 3 SEVERE OBESITY WITH SERIOUS COMORBIDITY AND BODY MASS INDEX (BMI) OF 45.0 TO 49.9 IN ADULT, UNSPECIFIED OBESITY TYPE (CMS/HCC): Primary | ICD-10-CM

## 2023-12-12 DIAGNOSIS — E66.01 CLASS 3 SEVERE OBESITY WITH SERIOUS COMORBIDITY AND BODY MASS INDEX (BMI) OF 45.0 TO 49.9 IN ADULT, UNSPECIFIED OBESITY TYPE (CMS/HCC): Primary | ICD-10-CM

## 2023-12-14 ENCOUNTER — TELEPHONE (OUTPATIENT)
Dept: FAMILY MEDICINE | Facility: CLINIC | Age: 38
End: 2023-12-14
Payer: COMMERCIAL

## 2023-12-14 NOTE — TELEPHONE ENCOUNTER
Patient is looking to refill his Weygovy. He states that he is having diffciulty finding it anywhere and eventually had it sent to a compound pharmacy @ 0.25mg. He states that he is responding to it very well and ideally would like to increase his dosage. He mentioned that he ultimately like to be at the 2.4mg. He is asking for Dr. Mitchell recommendation going forward. Also, he wants Dr. Mitchell to consider the difficulty of hunting down 0.5mg at more of the mainstream pharmacies. Is the compound pharmacy the best option at this point?

## 2023-12-15 DIAGNOSIS — E66.813 CLASS 3 SEVERE OBESITY WITH SERIOUS COMORBIDITY AND BODY MASS INDEX (BMI) OF 45.0 TO 49.9 IN ADULT, UNSPECIFIED OBESITY TYPE (CMS/HCC): Primary | ICD-10-CM

## 2023-12-15 DIAGNOSIS — G47.30 SLEEP APNEA, UNSPECIFIED TYPE: ICD-10-CM

## 2023-12-15 DIAGNOSIS — I10 PRIMARY HYPERTENSION: ICD-10-CM

## 2023-12-15 DIAGNOSIS — E66.01 CLASS 3 SEVERE OBESITY WITH SERIOUS COMORBIDITY AND BODY MASS INDEX (BMI) OF 45.0 TO 49.9 IN ADULT, UNSPECIFIED OBESITY TYPE (CMS/HCC): Primary | ICD-10-CM

## 2023-12-15 RX ORDER — TIRZEPATIDE 2.5 MG/.5ML
2.5 INJECTION, SOLUTION SUBCUTANEOUS
Qty: 2 ML | Refills: 0 | Status: SHIPPED | OUTPATIENT
Start: 2023-12-15 | End: 2024-01-14

## 2023-12-15 NOTE — TELEPHONE ENCOUNTER
Ok to increase to 0.5 mg.  Can continue with Compound pharmacy.  Other option is to continue to call other pharmacies for availability or check with mail order.  Can increase dose every 4 weeks, he can keep us informed of his progress and how he is tolerating every dose increase.

## 2023-12-26 ENCOUNTER — TELEPHONE (OUTPATIENT)
Dept: FAMILY MEDICINE | Facility: CLINIC | Age: 38
End: 2023-12-26
Payer: COMMERCIAL

## 2023-12-26 ENCOUNTER — OFFICE VISIT (OUTPATIENT)
Dept: FAMILY MEDICINE | Facility: CLINIC | Age: 38
End: 2023-12-26
Payer: COMMERCIAL

## 2023-12-26 VITALS
OXYGEN SATURATION: 98 % | BODY MASS INDEX: 44.1 KG/M2 | WEIGHT: 315 LBS | HEIGHT: 71 IN | DIASTOLIC BLOOD PRESSURE: 80 MMHG | SYSTOLIC BLOOD PRESSURE: 130 MMHG | HEART RATE: 91 BPM | RESPIRATION RATE: 18 BRPM | TEMPERATURE: 97.2 F

## 2023-12-26 DIAGNOSIS — J02.9 SORE THROAT: Primary | ICD-10-CM

## 2023-12-26 LAB
EXPIRATION DATE: NORMAL
Lab: NORMAL
POCT MANUFACTURER: NORMAL
S PYO AG THROAT QL: NEGATIVE

## 2023-12-26 PROCEDURE — 3079F DIAST BP 80-89 MM HG: CPT | Performed by: FAMILY MEDICINE

## 2023-12-26 PROCEDURE — 99213 OFFICE O/P EST LOW 20 MIN: CPT | Performed by: FAMILY MEDICINE

## 2023-12-26 PROCEDURE — 3008F BODY MASS INDEX DOCD: CPT | Performed by: FAMILY MEDICINE

## 2023-12-26 PROCEDURE — 87880 STREP A ASSAY W/OPTIC: CPT | Performed by: FAMILY MEDICINE

## 2023-12-26 PROCEDURE — 3075F SYST BP GE 130 - 139MM HG: CPT | Performed by: FAMILY MEDICINE

## 2023-12-26 RX ORDER — PENICILLIN V POTASSIUM 500 MG/1
500 TABLET, FILM COATED ORAL 2 TIMES DAILY
Qty: 20 TABLET | Refills: 0 | Status: SHIPPED | OUTPATIENT
Start: 2023-12-26 | End: 2024-01-05

## 2023-12-26 NOTE — TELEPHONE ENCOUNTER
Patient states that his throat is sore and he noticed some white patches.  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  I recommended he go to urgent care for testing either tonight (12/24) or use salt water gargles and come to the office on 12/26 for appointment.

## 2023-12-26 NOTE — PATIENT INSTRUCTIONS
If a medication that was written today is not covered by insurance, please ask your pharmacist if there is a cheaper alternative and let us know so we can send it.  You can also use [employer] at the pharmacy.    If a referral was given today for a provider that is not in your insurance network, please visit your insurance's website and find a provider in your network and let us know so we can generate a new referral.

## 2023-12-26 NOTE — PROGRESS NOTES
Subjective      Patient ID: Michael Lew is a 38 y.o. male is here for the following:    Sore throat  - Present for several days, worsening  - Son with strep throat going around his school  - No fevers or chills  - Postnasal drip, otherwise no associated symptoms    Rapid strep negative        The following have been reviewed and updated as appropriate in this visit:   Tobacco  Allergies  Meds  Problems  Med Hx  Surg Hx  Fam Hx         Patient Active Problem List   Diagnosis   • Scoliosis   • Seasonal allergies   • Shortness of breath   • Anxiety   • Primary hypertension       Social History     Tobacco Use   • Smoking status: Never   • Smokeless tobacco: Never   Vaping Use   • Vaping Use: Never used   Substance Use Topics   • Alcohol use: Yes   • Drug use: Yes     Types: Marijuana       Allergies as of 12/26/2023   • (No Known Allergies)         Current Outpatient Medications:   •  amLODIPine (NORVASC) 5 mg tablet, TAKE 1 TABLET DAILY, Disp: 90 tablet, Rfl: 3  •  escitalopram (LEXAPRO) 10 mg tablet, TAKE 1 TABLET DAILY, Disp: 90 tablet, Rfl: 3  •  multivit-min/ferrous fumarate (MULTI VITAMIN ORAL), Take by mouth., Disp: , Rfl:   •  penicillin v potassium (VEETID) 500 mg tablet, Take 1 tablet (500 mg total) by mouth 2 (two) times a day for 10 days., Disp: 20 tablet, Rfl: 0  •  tirzepatide, weight loss, (ZEPBOUND) 2.5 mg/0.5 mL pen injector, Inject 0.5 mL (2.5 mg total) under the skin every (seven) 7 days., Disp: 2 mL, Rfl: 0  •  albuterol HFA (VENTOLIN HFA) 90 mcg/actuation inhaler, Inhale 2 puffs every 6 (six) hours as needed for wheezing., Disp: 1 Inhaler, Rfl: 1  •  clonazePAM (KlonoPIN) 1 mg tablet, Take 1 tablet (1 mg total) by mouth daily as needed for anxiety., Disp: 15 tablet, Rfl: 0  •  triamcinolone (KENALOG) 0.1 % ointment, Apply 1 application. topically 2 (two) times a day., Disp: 30 g, Rfl: 1    Review of systems  Per HPI.    Objective   Vitals:    12/26/23 0907   BP: 130/80   BP Location: Left  "upper arm   Patient Position: Sitting   Pulse: 91   Resp: 18   Temp: 36.2 °C (97.2 °F)   TempSrc: Temporal   SpO2: 98%   Weight: (!) 149 kg (329 lb)   Height: 1.816 m (5' 11.5\")     Body mass index is 45.25 kg/m².    Physical Exam  Vitals reviewed.   Constitutional:       General: He is not in acute distress.     Appearance: He is well-developed.   HENT:      Right Ear: Tympanic membrane, ear canal and external ear normal.      Left Ear: Tympanic membrane, ear canal and external ear normal.      Mouth/Throat:      Pharynx: Posterior oropharyngeal erythema present. No oropharyngeal exudate.   Cardiovascular:      Rate and Rhythm: Normal rate and regular rhythm.      Heart sounds: Normal heart sounds. No murmur heard.  Pulmonary:      Effort: Pulmonary effort is normal. No respiratory distress.      Breath sounds: Normal breath sounds.   Abdominal:      General: Bowel sounds are normal. There is no distension.      Palpations: Abdomen is soft. There is no mass.      Tenderness: There is no abdominal tenderness. There is no rebound.   Musculoskeletal:      Cervical back: Normal range of motion and neck supple.   Psychiatric:         Behavior: Behavior normal.         Thought Content: Thought content normal.         Judgment: Judgment normal.       No images are attached to the encounter.     Assessment/Plan   Problem List Items Addressed This Visit    None  Visit Diagnoses     Sore throat    -  Primary  Posterior oropharynx and tonsils erythematous without exudates  Rapid strep negative    - Treat presumptively for strep throat and check upper respiratory culture, if negative he can stop antibiotics and we can consider viral causes and possible course of steroids to help with pain and swelling in his throat    Relevant Orders    POCT rapid strep A (Completed)    Upper respiratory culture          I spent 24 minutes on this date of service performing the following activities: obtaining history, performing examination, " entering orders, documenting and providing counseling and education.      Sam Mcconnell MD  12/26/2023    Portions of the above dictation were performed using Dragon dictation software.  Please excuse any typos or grammatical errors.

## 2023-12-28 LAB — BACTERIA THROAT CULT: ABNORMAL

## 2024-02-08 DIAGNOSIS — E66.01 CLASS 3 SEVERE OBESITY WITH SERIOUS COMORBIDITY AND BODY MASS INDEX (BMI) OF 45.0 TO 49.9 IN ADULT, UNSPECIFIED OBESITY TYPE (CMS/HCC): Primary | ICD-10-CM

## 2024-02-08 DIAGNOSIS — E66.813 CLASS 3 SEVERE OBESITY WITH SERIOUS COMORBIDITY AND BODY MASS INDEX (BMI) OF 45.0 TO 49.9 IN ADULT, UNSPECIFIED OBESITY TYPE (CMS/HCC): Primary | ICD-10-CM

## 2024-02-08 DIAGNOSIS — I10 PRIMARY HYPERTENSION: ICD-10-CM

## 2024-02-14 ENCOUNTER — NURSE ONLY (OUTPATIENT)
Dept: FAMILY MEDICINE | Facility: CLINIC | Age: 39
End: 2024-02-14
Payer: COMMERCIAL

## 2024-02-14 VITALS — HEIGHT: 72 IN | WEIGHT: 315 LBS | BODY MASS INDEX: 42.66 KG/M2

## 2024-03-09 ENCOUNTER — NURSE TRIAGE (OUTPATIENT)
Dept: FAMILY MEDICINE | Facility: CLINIC | Age: 39
End: 2024-03-09
Payer: COMMERCIAL

## 2024-03-09 RX ORDER — NITROFURANTOIN 25; 75 MG/1; MG/1
100 CAPSULE ORAL 2 TIMES DAILY
Qty: 10 CAPSULE | Refills: 0 | Status: SHIPPED | OUTPATIENT
Start: 2024-03-09 | End: 2024-03-27 | Stop reason: ALTCHOICE

## 2024-03-09 NOTE — TELEPHONE ENCOUNTER
Patient calling PCP office today with UTI symptoms, including Dysuria and blood with urination that started yesterday.   Reports that blood in urine was not visual today, but dysuria remains.    1. The patient does not have signs or symptoms of acute complicated UTI (acute UTI with symptoms suggestive of infection extending beyond bladder including, but not limited to: fever, chills, rigors, malaise, flank pain, CVA tenderness, pelvic pain).    2. The patient does fall into a population with special considerations (pregnancy, renal transplant, male sex at birth).    3. The patient does not have risk factors for Multi-Drug Resistant gram-negative infection (history of any of the following in the last 3 months: MDR infection; inpatient stay in a health care facility; use of fluroquinolones, sulfamethoxasole/trimethoprim, or broad spectrum antibiotics; travel to region of the world with increased risk of MDR, including [place], [place], [place], and [place]).        I have escalated the case to the PCP on call. The plan is as follows:Spoke with Dr. Mcconnell (On Call Provider) and advised of patient's symptoms. Instructed by Dr. Mcconnell to send or for Macrobid 500 mg PO BID x 5 days and have the patient follow up on Monday for urine sample if no relief of symptoms. Message/ information relayed to patient who verbalized understanding.   Message routed to PCP and Covering provider as SULMA

## 2024-03-12 DIAGNOSIS — I10 PRIMARY HYPERTENSION: ICD-10-CM

## 2024-03-12 DIAGNOSIS — E66.813 CLASS 3 SEVERE OBESITY WITH SERIOUS COMORBIDITY AND BODY MASS INDEX (BMI) OF 45.0 TO 49.9 IN ADULT, UNSPECIFIED OBESITY TYPE (CMS/HCC): Primary | ICD-10-CM

## 2024-03-12 DIAGNOSIS — E66.01 CLASS 3 SEVERE OBESITY WITH SERIOUS COMORBIDITY AND BODY MASS INDEX (BMI) OF 45.0 TO 49.9 IN ADULT, UNSPECIFIED OBESITY TYPE (CMS/HCC): Primary | ICD-10-CM

## 2024-03-27 ENCOUNTER — OFFICE VISIT (OUTPATIENT)
Dept: FAMILY MEDICINE | Facility: CLINIC | Age: 39
End: 2024-03-27
Payer: COMMERCIAL

## 2024-03-27 VITALS
TEMPERATURE: 98.1 F | HEIGHT: 72 IN | OXYGEN SATURATION: 98 % | DIASTOLIC BLOOD PRESSURE: 84 MMHG | SYSTOLIC BLOOD PRESSURE: 124 MMHG | HEART RATE: 92 BPM | BODY MASS INDEX: 41.99 KG/M2 | WEIGHT: 310 LBS

## 2024-03-27 DIAGNOSIS — E66.813 CLASS 3 SEVERE OBESITY WITH SERIOUS COMORBIDITY AND BODY MASS INDEX (BMI) OF 45.0 TO 49.9 IN ADULT, UNSPECIFIED OBESITY TYPE (CMS/HCC): ICD-10-CM

## 2024-03-27 DIAGNOSIS — E66.01 CLASS 3 SEVERE OBESITY WITH SERIOUS COMORBIDITY AND BODY MASS INDEX (BMI) OF 45.0 TO 49.9 IN ADULT, UNSPECIFIED OBESITY TYPE (CMS/HCC): ICD-10-CM

## 2024-03-27 DIAGNOSIS — I10 PRIMARY HYPERTENSION: ICD-10-CM

## 2024-03-27 DIAGNOSIS — Z00.00 WELLNESS EXAMINATION: ICD-10-CM

## 2024-03-27 DIAGNOSIS — F41.9 ANXIETY: Primary | ICD-10-CM

## 2024-03-27 PROCEDURE — 3074F SYST BP LT 130 MM HG: CPT | Performed by: FAMILY MEDICINE

## 2024-03-27 PROCEDURE — 99214 OFFICE O/P EST MOD 30 MIN: CPT | Performed by: FAMILY MEDICINE

## 2024-03-27 PROCEDURE — 3008F BODY MASS INDEX DOCD: CPT | Performed by: FAMILY MEDICINE

## 2024-03-27 PROCEDURE — 3079F DIAST BP 80-89 MM HG: CPT | Performed by: FAMILY MEDICINE

## 2024-03-27 RX ORDER — FLUTICASONE PROPIONATE 50 MCG
1 SPRAY, SUSPENSION (ML) NASAL DAILY
Qty: 16 G | Refills: 5 | Status: SHIPPED | OUTPATIENT
Start: 2024-03-27

## 2024-03-27 ASSESSMENT — ENCOUNTER SYMPTOMS
NERVOUS/ANXIOUS: 0
FATIGUE: 0
DIZZINESS: 0
SHORTNESS OF BREATH: 0
DECREASED CONCENTRATION: 0
WEAKNESS: 0
FEVER: 0
COUGH: 0
ARTHRALGIAS: 0
HEADACHES: 0
DYSPHORIC MOOD: 0
CHILLS: 0
ABDOMINAL PAIN: 0
LIGHT-HEADEDNESS: 0

## 2024-03-27 NOTE — PROGRESS NOTES
"    Subjective      Patient ID: Tristen Lew is a 38 y.o. male.  1985      - Patient presenting for follow up of medication check    - Obesity - On WEGOVY.  Down 27 lbs since initiation.  Feeling less thoughts about food in general.  Some fatigue the day after the injection.      - Anxiety - Stable and much improved!  Taking LEXAPRO and tolerating well.  Some decrease in sex drive.        The following have been reviewed and updated as appropriate in this visit:   Tobacco  Allergies  Meds  Problems  Med Hx  Surg Hx  Fam Hx       Review of Systems   Constitutional:  Negative for chills, fatigue and fever.   Eyes:  Negative for visual disturbance.   Respiratory:  Negative for cough and shortness of breath.    Gastrointestinal:  Negative for abdominal pain.   Musculoskeletal:  Negative for arthralgias.   Neurological:  Negative for dizziness, weakness, light-headedness and headaches.   Psychiatric/Behavioral:  Negative for decreased concentration and dysphoric mood. The patient is not nervous/anxious.        Objective     Vitals:    03/27/24 0901   BP: 124/84   Pulse: 92   Temp: 36.7 °C (98.1 °F)   SpO2: 98%   Weight: (!) 141 kg (310 lb)   Height: 1.816 m (5' 11.5\")     Body mass index is 42.63 kg/m².    Physical Exam  Vitals and nursing note reviewed.   Constitutional:       General: He is not in acute distress.     Appearance: Normal appearance. He is not ill-appearing, toxic-appearing or diaphoretic.   Cardiovascular:      Rate and Rhythm: Normal rate and regular rhythm.      Pulses: Normal pulses.      Heart sounds: Normal heart sounds.   Neurological:      General: No focal deficit present.      Mental Status: He is alert and oriented to person, place, and time.   Psychiatric:         Mood and Affect: Mood normal.         Behavior: Behavior normal.         Thought Content: Thought content normal.         Judgment: Judgment normal.         Assessment/Plan   Diagnoses and all orders for this " visit:    Anxiety (Primary)    Primary hypertension  -     semaglutide, weight loss, (WEGOVY) 2.4 mg/0.75 mL subcutaneous injection; Inject 2.4 mg under the skin every (seven) 7 days.    Class 3 severe obesity with serious comorbidity and body mass index (BMI) of 45.0 to 49.9 in adult, unspecified obesity type (CMS/HCC)  -     TSH w reflex FT4; Future  -     semaglutide, weight loss, (WEGOVY) 2.4 mg/0.75 mL subcutaneous injection; Inject 2.4 mg under the skin every (seven) 7 days.    Wellness examination  -     Lipid panel; Future  -     Comprehensive metabolic panel; Future  -     CBC and Differential; Future    Other orders  -     fluticasone propionate (FLONASE) 50 mcg/actuation nasal spray; Administer 1 spray into each nostril daily.    1. Anxiety  -Overall much improved  -Continue Lexapro at current dosing  -Continue healthy lifestyle changes  -Follow-up in 3 months    2. Primary hypertension  -Continue amlodipine  - semaglutide, weight loss, (WEGOVY) 2.4 mg/0.75 mL subcutaneous injection; Inject 2.4 mg under the skin every (seven) 7 days.  Dispense: 3 mL; Refill: 3    3. Class 3 severe obesity with serious comorbidity and body mass index (BMI) of 45.0 to 49.9 in adult, unspecified obesity type (CMS/HCC)  -good weight loss today since initiating Wegovy.  Patient now on max dosing at 2.4 mg.  Will continue on this dose  -Continue with healthy lifestyle changes  -Check updated lab work before next visit  -Follow-up with blood work in 3 months  - TSH w reflex FT4; Future  - TSH w reflex FT4  - semaglutide, weight loss, (WEGOVY) 2.4 mg/0.75 mL subcutaneous injection; Inject 2.4 mg under the skin every (seven) 7 days.  Dispense: 3 mL; Refill: 3    4. Wellness examination  - Lipid panel; Future  - Comprehensive metabolic panel; Future  - CBC and Differential; Future  - Lipid panel  - Comprehensive metabolic panel  - CBC and Differential

## 2024-04-01 ENCOUNTER — TELEPHONE (OUTPATIENT)
Dept: FAMILY MEDICINE | Facility: CLINIC | Age: 39
End: 2024-04-01
Payer: COMMERCIAL

## 2024-04-01 NOTE — TELEPHONE ENCOUNTER
Having concerns for a UTI--seeing pink spots in the urine and a burning sensation. Had similar symptoms 3 weeks ago. Was treated with an antibiotic over the weekend and symptoms improved. Then symptoms returned and now having similar symptoms again. Will come in tomorrow for evaluation. Advised to go to UC if symptoms worsen overnight.

## 2024-04-02 ENCOUNTER — OFFICE VISIT (OUTPATIENT)
Dept: FAMILY MEDICINE | Facility: CLINIC | Age: 39
End: 2024-04-02
Payer: COMMERCIAL

## 2024-04-02 VITALS
OXYGEN SATURATION: 98 % | HEART RATE: 82 BPM | BODY MASS INDEX: 41.81 KG/M2 | WEIGHT: 304 LBS | TEMPERATURE: 96.3 F | SYSTOLIC BLOOD PRESSURE: 130 MMHG | DIASTOLIC BLOOD PRESSURE: 88 MMHG

## 2024-04-02 DIAGNOSIS — R39.9 UTI SYMPTOMS: Primary | ICD-10-CM

## 2024-04-02 DIAGNOSIS — N20.0 KIDNEY STONES: ICD-10-CM

## 2024-04-02 PROBLEM — R06.02 SHORTNESS OF BREATH: Status: RESOLVED | Noted: 2020-12-21 | Resolved: 2024-04-02

## 2024-04-02 LAB
BILIRUBIN, POC: NEGATIVE
BLOOD URINE, POC: NORMAL
CLARITY, POC: CLEAR
COLOR, POC: YELLOW
EXPIRATION DATE: NORMAL
GLUCOSE URINE, POC: NEGATIVE
KETONES, POC: NEGATIVE
LEUKOCYTE EST, POC: NORMAL
Lab: NORMAL
NITRITE, POC: NEGATIVE
PH, POC: 6
POCT MANUFACTURER: NORMAL
PROTEIN, POC: NEGATIVE
SPECIFIC GRAVITY, POC: 1.02
UROBILINOGEN, POC: 4

## 2024-04-02 PROCEDURE — 3008F BODY MASS INDEX DOCD: CPT | Performed by: FAMILY MEDICINE

## 2024-04-02 PROCEDURE — 3075F SYST BP GE 130 - 139MM HG: CPT | Performed by: FAMILY MEDICINE

## 2024-04-02 PROCEDURE — 3079F DIAST BP 80-89 MM HG: CPT | Performed by: FAMILY MEDICINE

## 2024-04-02 PROCEDURE — 99213 OFFICE O/P EST LOW 20 MIN: CPT | Performed by: FAMILY MEDICINE

## 2024-04-02 PROCEDURE — 81002 URINALYSIS NONAUTO W/O SCOPE: CPT | Performed by: FAMILY MEDICINE

## 2024-04-02 ASSESSMENT — ENCOUNTER SYMPTOMS
DIFFICULTY URINATING: 0
CHILLS: 0
PALPITATIONS: 0
SHORTNESS OF BREATH: 0
ABDOMINAL PAIN: 0
FEVER: 0
DYSURIA: 1
HEMATURIA: 1
FATIGUE: 0
FREQUENCY: 0
DIARRHEA: 0
NAUSEA: 0
COUGH: 0

## 2024-04-02 NOTE — PROGRESS NOTES
Subjective      Patient ID: Tristen Lew is a 38 y.o. male.  1985      - Patient presenting with recurrence of urinary symptoms  - Saw blood the past few days  - Last evening passed what felt like a stone  - Pain with passing  - This AM blood is improved and better   - no pain with urination since last night  - No flank pain or abd pain  - No history of kidney stones in the past or kidney stones in the family         The following have been reviewed and updated as appropriate in this visit:   Tobacco  Allergies  Meds  Problems  Med Hx  Surg Hx  Fam Hx       Review of Systems   Constitutional:  Negative for chills, fatigue and fever.   Eyes:  Negative for visual disturbance.   Respiratory:  Negative for cough and shortness of breath.    Cardiovascular:  Negative for chest pain, palpitations and leg swelling.   Gastrointestinal:  Negative for abdominal pain, diarrhea and nausea.   Genitourinary:  Positive for dysuria and hematuria. Negative for difficulty urinating and frequency.       Objective     Vitals:    04/02/24 1137   BP: 130/88   BP Location: Right upper arm   Patient Position: Sitting   Pulse: 82   Temp: (!) 35.7 °C (96.3 °F)   SpO2: 98%   Weight: (!) 138 kg (304 lb)     Body mass index is 41.81 kg/m².    Physical Exam  Vitals and nursing note reviewed.   Constitutional:       General: He is not in acute distress.     Appearance: Normal appearance. He is not ill-appearing, toxic-appearing or diaphoretic.   Cardiovascular:      Rate and Rhythm: Normal rate and regular rhythm.      Pulses: Normal pulses.      Heart sounds: Normal heart sounds.   Skin:     Capillary Refill: Capillary refill takes less than 2 seconds.   Neurological:      General: No focal deficit present.      Mental Status: He is alert and oriented to person, place, and time.   Psychiatric:         Mood and Affect: Mood normal.         Behavior: Behavior normal.         Thought Content: Thought content normal.         Judgment:  Judgment normal.         Assessment/Plan   Diagnoses and all orders for this visit:    UTI symptoms (Primary)  -     POCT urinalysis dipstick  -     Urinalysis with microscopic  -     Urine culture    Kidney stones  -     Urinalysis with microscopic  -     Urine culture  -     ULTRASOUND KIDNEYS / BLADDER; Future    1. UTI symptoms  - Likely from stone  - Send urine for culture and analysis  - Increase hydration  - Follow up pending above   - POCT urinalysis dipstick  - Urinalysis with microscopic  - Urine culture    2. Kidney stones  - As above  - Check ultrasound  - Urinalysis with microscopic  - Urine culture  - ULTRASOUND KIDNEYS / BLADDER; Future

## 2024-04-03 LAB
APPEARANCE UR: CLEAR
BACTERIA #/AREA URNS HPF: NORMAL /[HPF]
BILIRUB UR QL STRIP: NEGATIVE
CASTS URNS QL MICRO: NORMAL /LPF
COLOR UR: YELLOW
EPI CELLS #/AREA URNS HPF: NORMAL /HPF (ref 0–10)
GLUCOSE UR QL STRIP: NEGATIVE
HGB UR QL STRIP: NEGATIVE
KETONES UR QL STRIP: NEGATIVE
LEUKOCYTE ESTERASE UR QL STRIP: ABNORMAL
MICRO URNS: ABNORMAL
NITRITE UR QL STRIP: NEGATIVE
PH UR STRIP: 6.5 [PH] (ref 5–7.5)
PROT UR QL STRIP: NEGATIVE
RBC #/AREA URNS HPF: NORMAL /HPF (ref 0–2)
SP GR UR STRIP: 1.02 (ref 1–1.03)
UROBILINOGEN UR STRIP-MCNC: 0.2 MG/DL (ref 0.2–1)
WBC #/AREA URNS HPF: NORMAL /HPF (ref 0–5)

## 2024-04-06 LAB
BACTERIA UR CULT: ABNORMAL
BACTERIA UR CULT: ABNORMAL
OTHER ANTIBIOTIC SUSC ISLT: ABNORMAL

## 2024-04-08 ENCOUNTER — TELEPHONE (OUTPATIENT)
Dept: FAMILY MEDICINE | Facility: CLINIC | Age: 39
End: 2024-04-08
Payer: COMMERCIAL

## 2024-04-08 RX ORDER — NITROFURANTOIN 25; 75 MG/1; MG/1
100 CAPSULE ORAL 2 TIMES DAILY
Qty: 14 CAPSULE | Refills: 0 | Status: SHIPPED | OUTPATIENT
Start: 2024-04-08 | End: 2024-04-15

## 2024-04-08 NOTE — TELEPHONE ENCOUNTER
Patient called to follow up on lab results for urine sample. He is asking for medication to help with symptoms and results. Please notify patient with next steps.

## 2024-04-17 ENCOUNTER — HOSPITAL ENCOUNTER (OUTPATIENT)
Dept: RADIOLOGY | Age: 39
Discharge: HOME | End: 2024-04-17
Attending: FAMILY MEDICINE
Payer: COMMERCIAL

## 2024-04-17 DIAGNOSIS — N20.0 KIDNEY STONES: ICD-10-CM

## 2024-04-17 PROCEDURE — 76770 US EXAM ABDO BACK WALL COMP: CPT

## 2024-04-19 NOTE — RESULT ENCOUNTER NOTE
Hi,  Dr. Mitchell is unavailable today, but am happy to report that your kidney and bladder is look good.  You have a mildly enlarged spleen, but this is stable.  Let us know if you have any questions.  Take care,  Olive Mejia, DO

## 2024-09-11 RX ORDER — AMLODIPINE BESYLATE 5 MG/1
5 TABLET ORAL DAILY
Qty: 90 TABLET | Refills: 3 | Status: SHIPPED | OUTPATIENT
Start: 2024-09-11

## 2024-09-11 RX ORDER — ESCITALOPRAM OXALATE 10 MG/1
10 TABLET ORAL DAILY
Qty: 90 TABLET | Refills: 3 | Status: SHIPPED | OUTPATIENT
Start: 2024-09-11 | End: 2024-09-12 | Stop reason: SDUPTHER

## 2024-09-11 NOTE — TELEPHONE ENCOUNTER
"Medicine Refill Request    Last Office Visit: 4/2/2024   Last Consult Visit: Visit date not found  Last Telemedicine Visit: 1/23/2023 Ashley Clarke MD    Next Appointment: 9/12/2024      Current Outpatient Medications:     albuterol HFA (VENTOLIN HFA) 90 mcg/actuation inhaler, Inhale 2 puffs every 6 (six) hours as needed for wheezing., Disp: 1 Inhaler, Rfl: 1    amLODIPine (NORVASC) 5 mg tablet, TAKE 1 TABLET DAILY, Disp: 90 tablet, Rfl: 3    clonazePAM (KlonoPIN) 1 mg tablet, Take 1 tablet (1 mg total) by mouth daily as needed for anxiety., Disp: 15 tablet, Rfl: 0    escitalopram (LEXAPRO) 10 mg tablet, TAKE 1 TABLET DAILY, Disp: 90 tablet, Rfl: 3    fluticasone propionate (FLONASE) 50 mcg/actuation nasal spray, Administer 1 spray into each nostril daily., Disp: 16 g, Rfl: 5    multivit-min/ferrous fumarate (MULTI VITAMIN ORAL), Take by mouth., Disp: , Rfl:     triamcinolone (KENALOG) 0.1 % ointment, Apply 1 application. topically 2 (two) times a day., Disp: 30 g, Rfl: 1      BP Readings from Last 3 Encounters:   04/02/24 130/88   03/27/24 124/84   12/26/23 130/80       Recent Lab results:  Lab Results   Component Value Date    CHOL 181 01/07/2021   ,   Lab Results   Component Value Date    HDL 34 (L) 01/07/2021   ,   Lab Results   Component Value Date    LDLCALC 117 (H) 01/07/2021   ,   Lab Results   Component Value Date    TRIG 186 (H) 01/07/2021        Lab Results   Component Value Date    GLUCOSE 93 11/10/2021   , No results found for: \"HGBA1C\"      Lab Results   Component Value Date    CREATININE 1.0 11/10/2021       Lab Results   Component Value Date    TSH 3.43 01/07/2021         No results found for: \"HGBA1C\"  "

## 2024-09-12 ENCOUNTER — OFFICE VISIT (OUTPATIENT)
Dept: FAMILY MEDICINE | Facility: CLINIC | Age: 39
End: 2024-09-12
Payer: COMMERCIAL

## 2024-09-12 VITALS
WEIGHT: 279 LBS | HEART RATE: 75 BPM | SYSTOLIC BLOOD PRESSURE: 132 MMHG | HEIGHT: 72 IN | BODY MASS INDEX: 37.79 KG/M2 | RESPIRATION RATE: 18 BRPM | OXYGEN SATURATION: 98 % | TEMPERATURE: 97.5 F | DIASTOLIC BLOOD PRESSURE: 86 MMHG

## 2024-09-12 DIAGNOSIS — E66.01 CLASS 2 SEVERE OBESITY WITH SERIOUS COMORBIDITY AND BODY MASS INDEX (BMI) OF 38.0 TO 38.9 IN ADULT, UNSPECIFIED OBESITY TYPE (CMS/HCC): ICD-10-CM

## 2024-09-12 DIAGNOSIS — I10 PRIMARY HYPERTENSION: Primary | ICD-10-CM

## 2024-09-12 DIAGNOSIS — E66.812 CLASS 2 SEVERE OBESITY WITH SERIOUS COMORBIDITY AND BODY MASS INDEX (BMI) OF 38.0 TO 38.9 IN ADULT, UNSPECIFIED OBESITY TYPE (CMS/HCC): ICD-10-CM

## 2024-09-12 DIAGNOSIS — F41.9 ANXIETY: ICD-10-CM

## 2024-09-12 PROCEDURE — 3075F SYST BP GE 130 - 139MM HG: CPT | Performed by: FAMILY MEDICINE

## 2024-09-12 PROCEDURE — 3008F BODY MASS INDEX DOCD: CPT | Performed by: FAMILY MEDICINE

## 2024-09-12 PROCEDURE — 3079F DIAST BP 80-89 MM HG: CPT | Performed by: FAMILY MEDICINE

## 2024-09-12 PROCEDURE — 99215 OFFICE O/P EST HI 40 MIN: CPT | Performed by: FAMILY MEDICINE

## 2024-09-12 RX ORDER — ESCITALOPRAM OXALATE 20 MG/1
20 TABLET ORAL DAILY
Qty: 90 TABLET | Refills: 1 | Status: SHIPPED | OUTPATIENT
Start: 2024-09-12 | End: 2025-01-09 | Stop reason: SDUPTHER

## 2024-09-12 RX ORDER — TIRZEPATIDE 5 MG/.5ML
5 INJECTION, SOLUTION SUBCUTANEOUS
Qty: 2 ML | Refills: 1 | Status: SHIPPED | OUTPATIENT
Start: 2024-09-12

## 2024-09-12 RX ORDER — SEMAGLUTIDE 2.4 MG/.75ML
INJECTION, SOLUTION SUBCUTANEOUS
COMMUNITY
Start: 2024-07-01 | End: 2024-09-12 | Stop reason: ALTCHOICE

## 2024-09-12 ASSESSMENT — ENCOUNTER SYMPTOMS
FATIGUE: 0
ABDOMINAL PAIN: 0
CHILLS: 0
HEADACHES: 0
PALPITATIONS: 0
SHORTNESS OF BREATH: 0
DIZZINESS: 0
FEVER: 0
CONSTIPATION: 0
COUGH: 0
LIGHT-HEADEDNESS: 0

## 2024-09-12 NOTE — PROGRESS NOTES
Subjective      Patient ID: Tristen Lew is a 39 y.o. male.  1985      - Patient presenting for medication check    - HTN - Stable and improved.  Taking AMLODIPINE.  Not checking at home.  Great weight loss since last visit!    - Anxiety - Taking LEXAPRO.  Interested in increasing dose.    - Obesity - On WEGOVY.  Down 50+ lbs overall!  Feeling great.  Diet has improved.  Better choices.  Less alcohol.  Calories down.  Exercising daily.         The following have been reviewed and updated as appropriate in this visit:   Tobacco  Allergies  Meds  Problems  Med Hx  Surg Hx  Fam Hx       Past Medical History:   Diagnosis Date    Anxiety     Lumbar pain     chronic    Panic attacks        History reviewed. No pertinent surgical history.    Social History     Socioeconomic History    Marital status:      Spouse name: Not on file    Number of children: Not on file    Years of education: Not on file    Highest education level: Not on file   Occupational History    Not on file   Tobacco Use    Smoking status: Never    Smokeless tobacco: Never   Vaping Use    Vaping Use: Never used   Substance and Sexual Activity    Alcohol use: Yes    Drug use: Yes     Types: Marijuana    Sexual activity: Not on file   Other Topics Concern    Not on file   Social History Narrative    Not on file     Social Determinants of Health     Financial Resource Strain: Not on file   Food Insecurity: No Food Insecurity (11/10/2021)    Hunger Vital Sign     Worried About Running Out of Food in the Last Year: Never true     Ran Out of Food in the Last Year: Never true   Transportation Needs: Not on file   Physical Activity: Not on file   Stress: Not on file   Social Connections: Not on file   Intimate Partner Violence: Not on file   Housing Stability: Not on file       Family History   Problem Relation Age of Onset    Breast cancer Biological Mother     Diabetes Biological Mother     Hypertension Biological Mother     Asthma  "Biological Father     Diabetes Biological Father     Sleep apnea Biological Father     Hypertension Biological Brother     Sleep apnea Biological Brother        Patient has no known allergies.    Current Outpatient Medications   Medication Sig Dispense Refill    amLODIPine (NORVASC) 5 mg tablet TAKE 1 TABLET DAILY 90 tablet 3    escitalopram (LEXAPRO) 10 mg tablet TAKE 1 TABLET DAILY 90 tablet 3    fluticasone propionate (FLONASE) 50 mcg/actuation nasal spray Administer 1 spray into each nostril daily. 16 g 5    multivit-min/ferrous fumarate (MULTI VITAMIN ORAL) Take by mouth.      tirzepatide, weight loss, (ZEPBOUND) 5 mg/0.5 mL subcutaneous PEN injector Inject 0.5 mL (5 mg total) under the skin every (seven) 7 days. 2 mL 1    albuterol HFA (VENTOLIN HFA) 90 mcg/actuation inhaler Inhale 2 puffs every 6 (six) hours as needed for wheezing. 1 Inhaler 1    clonazePAM (KlonoPIN) 1 mg tablet Take 1 tablet (1 mg total) by mouth daily as needed for anxiety. 15 tablet 0    triamcinolone (KENALOG) 0.1 % ointment Apply 1 application. topically 2 (two) times a day. 30 g 1     No current facility-administered medications for this visit.       Review of Systems   Constitutional:  Negative for chills, fatigue and fever.   Eyes:  Negative for visual disturbance.   Respiratory:  Negative for cough and shortness of breath.    Cardiovascular:  Negative for chest pain and palpitations.   Gastrointestinal:  Negative for abdominal pain and constipation.   Neurological:  Negative for dizziness, light-headedness and headaches.       Objective     Vitals:    09/12/24 0904   BP: 132/86   BP Location: Left upper arm   Patient Position: Sitting   Pulse: 75   Resp: 18   Temp: 36.4 °C (97.5 °F)   TempSrc: Temporal   SpO2: 98%   Weight: 127 kg (279 lb)   Height: 1.816 m (5' 11.5\")     Body mass index is 38.37 kg/m².    Physical Exam  Vitals and nursing note reviewed.   Constitutional:       General: He is not in acute distress.     Appearance: " Normal appearance. He is not ill-appearing, toxic-appearing or diaphoretic.   Cardiovascular:      Rate and Rhythm: Normal rate and regular rhythm.      Pulses: Normal pulses.   Neurological:      General: No focal deficit present.      Mental Status: He is alert and oriented to person, place, and time.   Psychiatric:         Mood and Affect: Mood normal.         Behavior: Behavior normal.         Thought Content: Thought content normal.         Judgment: Judgment normal.         Assessment/Plan   Diagnoses and all orders for this visit:    Primary hypertension (Primary)    Class 2 severe obesity with serious comorbidity and body mass index (BMI) of 38.0 to 38.9 in adult, unspecified obesity type (CMS/HCC)    Anxiety    Other orders  -     tirzepatide, weight loss, (ZEPBOUND) 5 mg/0.5 mL subcutaneous PEN injector; Inject 0.5 mL (5 mg total) under the skin every (seven) 7 days.    1. Primary hypertension  - Stable today  - Continue current regimen  - Continued weight loss and improvement in exercise routine will improve even more  - Follow up in 6 months or sooner as needed    2. Class 2 severe obesity with serious comorbidity and body mass index (BMI) of 38.0 to 38.9 in adult, unspecified obesity type (CMS/HCC)  - Feels that he has plateaued with the WEGOVY to some extent and wanted to see if ZEPBOUND is covered  - He does know that his insurance covers this but will likely required PA  - Discussed possible delays with availability as well  - Ok to try and switch - sent to the pharmacy today to await approval    3. Anxiety  - Ok to increase LEXAPRO to 20 mg daily  - Patient to notify me on improvement with dose adjustment    I spent  42 minutes on this date of service performing the following activities: obtaining history, performing examination, entering orders, documenting, obtaining / reviewing records, providing counseling and education, independently reviewing study/studies, and coordinating care.

## 2024-09-13 ENCOUNTER — TELEPHONE (OUTPATIENT)
Dept: FAMILY MEDICINE | Facility: CLINIC | Age: 39
End: 2024-09-13
Payer: COMMERCIAL

## 2024-09-13 NOTE — TELEPHONE ENCOUNTER
Medication: Zepbound  Date: 9/12/24  Submitted through: FAX  Prescriber: Stephen Sparks PA: yes but for WEgovy  Form/ insurance: faxed form for [employer](see scan) 9/12.    Awaiting response.     APPROVED (called [phone]) received verbal. Fax should be coming sometime today.

## 2025-01-09 RX ORDER — ESCITALOPRAM OXALATE 20 MG/1
20 TABLET ORAL DAILY
Qty: 90 TABLET | Refills: 1 | Status: SHIPPED | OUTPATIENT
Start: 2025-01-09

## 2025-01-09 NOTE — TELEPHONE ENCOUNTER
"Medicine Refill Request    Last Office Visit: 9/12/2024   Last Consult Visit: Visit date not found  Last Telemedicine Visit: 1/23/2023 Ashley Clarke MD    Next Appointment: Visit date not found      Current Outpatient Medications:     albuterol HFA (VENTOLIN HFA) 90 mcg/actuation inhaler, Inhale 2 puffs every 6 (six) hours as needed for wheezing., Disp: 1 Inhaler, Rfl: 1    amLODIPine (NORVASC) 5 mg tablet, TAKE 1 TABLET DAILY, Disp: 90 tablet, Rfl: 3    clonazePAM (KlonoPIN) 1 mg tablet, Take 1 tablet (1 mg total) by mouth daily as needed for anxiety., Disp: 15 tablet, Rfl: 0    escitalopram (LEXAPRO) 20 mg tablet, Take 1 tablet (20 mg total) by mouth daily., Disp: 90 tablet, Rfl: 1    fluticasone propionate (FLONASE) 50 mcg/actuation nasal spray, Administer 1 spray into each nostril daily., Disp: 16 g, Rfl: 5    multivit-min/ferrous fumarate (MULTI VITAMIN ORAL), Take by mouth., Disp: , Rfl:     tirzepatide, weight loss, (ZEPBOUND) 5 mg/0.5 mL subcutaneous PEN injector, Inject 0.5 mL (5 mg total) under the skin every (seven) 7 days., Disp: 2 mL, Rfl: 1    triamcinolone (KENALOG) 0.1 % ointment, Apply 1 application. topically 2 (two) times a day., Disp: 30 g, Rfl: 1      BP Readings from Last 3 Encounters:   09/12/24 132/86   04/02/24 130/88   03/27/24 124/84       Recent Lab results:  Lab Results   Component Value Date    CHOL 181 01/07/2021   ,   Lab Results   Component Value Date    HDL 34 (L) 01/07/2021   ,   Lab Results   Component Value Date    LDLCALC 117 (H) 01/07/2021   ,   Lab Results   Component Value Date    TRIG 186 (H) 01/07/2021        Lab Results   Component Value Date    GLUCOSE 93 11/10/2021   , No results found for: \"HGBA1C\"      Lab Results   Component Value Date    CREATININE 1.0 11/10/2021       Lab Results   Component Value Date    TSH 3.43 01/07/2021         No results found for: \"HGBA1C\"    "

## 2025-07-12 DIAGNOSIS — E66.813 CLASS 3 SEVERE OBESITY WITH SERIOUS COMORBIDITY AND BODY MASS INDEX (BMI) OF 45.0 TO 49.9 IN ADULT, UNSPECIFIED OBESITY TYPE: ICD-10-CM

## 2025-07-12 RX ORDER — TIRZEPATIDE 5 MG/.5ML
5 INJECTION, SOLUTION SUBCUTANEOUS
Qty: 2 ML | Refills: 1 | Status: CANCELLED | OUTPATIENT
Start: 2025-07-12

## 2025-07-14 DIAGNOSIS — E66.813 CLASS 3 SEVERE OBESITY WITH SERIOUS COMORBIDITY AND BODY MASS INDEX (BMI) OF 45.0 TO 49.9 IN ADULT, UNSPECIFIED OBESITY TYPE: Primary | ICD-10-CM

## 2025-07-14 RX ORDER — ESCITALOPRAM OXALATE 20 MG/1
20 TABLET ORAL DAILY
Qty: 90 TABLET | Refills: 0 | Status: SHIPPED | OUTPATIENT
Start: 2025-07-14

## 2025-07-26 ENCOUNTER — NURSE TRIAGE (OUTPATIENT)
Dept: FAMILY MEDICINE | Facility: CLINIC | Age: 40
End: 2025-07-26
Payer: COMMERCIAL

## 2025-07-26 RX ORDER — AMLODIPINE BESYLATE 5 MG/1
5 TABLET ORAL DAILY
Qty: 5 TABLET | Refills: 0 | Status: SHIPPED | OUTPATIENT
Start: 2025-07-26 | End: 2025-07-31

## 2025-07-26 RX ORDER — ESCITALOPRAM OXALATE 20 MG/1
20 TABLET ORAL DAILY
Qty: 5 TABLET | Refills: 0 | Status: SHIPPED | OUTPATIENT
Start: 2025-07-26 | End: 2025-07-31

## 2025-07-26 NOTE — TELEPHONE ENCOUNTER
Synopsis:  Patient calling w/ c/o having forgotten his pill wheel at home and on his way to [place] for vacation the next 5 days with his family. Requesting short supply of his Lexapro and Norvasc sent there. Short refill Rxs sent. He was very appreciative.  Disposition:  Information or Advice Only Call  Care Advice:  Care Advice Given       No Care Advice given for this encounter.             Orders Placed This Encounter:  Orders Placed This Encounter    amLODIPine (NORVASC) 5 mg tablet     Sig: Take 1 tablet (5 mg total) by mouth daily for 5 days.     Dispense:  5 tablet     Refill:  0    escitalopram (LEXAPRO) 20 mg tablet     Sig: Take 1 tablet (20 mg total) by mouth daily for 5 days.     Dispense:  5 tablet     Refill:  0

## 2025-08-19 DIAGNOSIS — E66.813 CLASS 3 SEVERE OBESITY WITH SERIOUS COMORBIDITY AND BODY MASS INDEX (BMI) OF 45.0 TO 49.9 IN ADULT, UNSPECIFIED OBESITY TYPE: ICD-10-CM

## 2025-08-22 DIAGNOSIS — E66.812 CLASS 2 SEVERE OBESITY WITH SERIOUS COMORBIDITY AND BODY MASS INDEX (BMI) OF 38.0 TO 38.9 IN ADULT, UNSPECIFIED OBESITY TYPE (CMS/HCC): Primary | ICD-10-CM

## 2025-08-22 DIAGNOSIS — F41.9 ANXIETY: ICD-10-CM

## 2025-08-22 DIAGNOSIS — E66.01 CLASS 2 SEVERE OBESITY WITH SERIOUS COMORBIDITY AND BODY MASS INDEX (BMI) OF 38.0 TO 38.9 IN ADULT, UNSPECIFIED OBESITY TYPE (CMS/HCC): Primary | ICD-10-CM

## 2025-08-22 RX ORDER — TIRZEPATIDE 7.5 MG/.5ML
INJECTION, SOLUTION SUBCUTANEOUS
Qty: 3 ML | Refills: 0 | Status: SHIPPED | OUTPATIENT
Start: 2025-08-22

## 2025-08-25 RX ORDER — ESCITALOPRAM OXALATE 20 MG/1
20 TABLET ORAL DAILY
Qty: 90 TABLET | Refills: 0 | Status: SHIPPED | OUTPATIENT
Start: 2025-08-25 | End: 2025-11-23